# Patient Record
Sex: FEMALE | Race: WHITE | NOT HISPANIC OR LATINO | Employment: OTHER | URBAN - METROPOLITAN AREA
[De-identification: names, ages, dates, MRNs, and addresses within clinical notes are randomized per-mention and may not be internally consistent; named-entity substitution may affect disease eponyms.]

---

## 2017-05-25 ENCOUNTER — HOSPITAL ENCOUNTER (OUTPATIENT)
Dept: MAMMOGRAPHY | Facility: CLINIC | Age: 62
Discharge: HOME | End: 2017-05-25
Attending: FAMILY MEDICINE | Admitting: FAMILY MEDICINE
Payer: COMMERCIAL

## 2017-05-25 DIAGNOSIS — Z12.31 VISIT FOR SCREENING MAMMOGRAM: ICD-10-CM

## 2017-05-25 PROCEDURE — G0202 SCR MAMMO BI INCL CAD: HCPCS

## 2017-06-03 ENCOUNTER — HEALTH MAINTENANCE LETTER (OUTPATIENT)
Age: 62
End: 2017-06-03

## 2018-06-14 ENCOUNTER — HOSPITAL ENCOUNTER (OUTPATIENT)
Dept: MAMMOGRAPHY | Facility: CLINIC | Age: 63
Discharge: HOME | End: 2018-06-14
Attending: FAMILY MEDICINE | Admitting: FAMILY MEDICINE
Payer: COMMERCIAL

## 2018-06-14 DIAGNOSIS — Z12.31 VISIT FOR SCREENING MAMMOGRAM: ICD-10-CM

## 2018-06-14 PROCEDURE — 77067 SCR MAMMO BI INCL CAD: CPT

## 2019-08-16 ENCOUNTER — HOSPITAL ENCOUNTER (OUTPATIENT)
Dept: MAMMOGRAPHY | Facility: CLINIC | Age: 64
Discharge: HOME | End: 2019-08-16
Attending: FAMILY MEDICINE | Admitting: FAMILY MEDICINE
Payer: COMMERCIAL

## 2019-08-16 DIAGNOSIS — Z12.31 VISIT FOR SCREENING MAMMOGRAM: ICD-10-CM

## 2019-08-16 PROCEDURE — 77063 BREAST TOMOSYNTHESIS BI: CPT

## 2021-01-19 ENCOUNTER — HOSPITAL ENCOUNTER (EMERGENCY)
Facility: CLINIC | Age: 66
Discharge: HOME | End: 2021-01-19
Attending: EMERGENCY MEDICINE | Admitting: EMERGENCY MEDICINE
Payer: COMMERCIAL

## 2021-01-19 VITALS
BODY MASS INDEX: 33.32 KG/M2 | OXYGEN SATURATION: 97 % | DIASTOLIC BLOOD PRESSURE: 62 MMHG | WEIGHT: 200 LBS | RESPIRATION RATE: 24 BRPM | HEART RATE: 96 BPM | SYSTOLIC BLOOD PRESSURE: 122 MMHG | HEIGHT: 65 IN | TEMPERATURE: 99.4 F

## 2021-01-19 DIAGNOSIS — U07.1 INFECTION DUE TO 2019 NOVEL CORONAVIRUS: ICD-10-CM

## 2021-01-19 DIAGNOSIS — E86.0 DEHYDRATION: ICD-10-CM

## 2021-01-19 DIAGNOSIS — R11.2 NON-INTRACTABLE VOMITING WITH NAUSEA, UNSPECIFIED VOMITING TYPE: ICD-10-CM

## 2021-01-19 LAB
ALBUMIN SERPL-MCNC: 3.4 G/DL (ref 3.4–5)
ALBUMIN UR-MCNC: 70 MG/DL
ALP SERPL-CCNC: 101 U/L (ref 40–150)
ALT SERPL W P-5'-P-CCNC: 39 U/L (ref 0–50)
ANION GAP SERPL CALCULATED.3IONS-SCNC: 5 MMOL/L (ref 3–14)
APPEARANCE UR: CLEAR
AST SERPL W P-5'-P-CCNC: 48 U/L (ref 0–45)
BASOPHILS # BLD AUTO: 0 10E9/L (ref 0–0.2)
BASOPHILS NFR BLD AUTO: 0.2 %
BILIRUB SERPL-MCNC: 0.5 MG/DL (ref 0.2–1.3)
BILIRUB UR QL STRIP: NEGATIVE
BUN SERPL-MCNC: 13 MG/DL (ref 7–30)
CALCIUM SERPL-MCNC: 9.1 MG/DL (ref 8.5–10.1)
CHLORIDE SERPL-SCNC: 104 MMOL/L (ref 94–109)
CO2 SERPL-SCNC: 27 MMOL/L (ref 20–32)
COLOR UR AUTO: YELLOW
CREAT SERPL-MCNC: 1.1 MG/DL (ref 0.52–1.04)
DIFFERENTIAL METHOD BLD: ABNORMAL
EOSINOPHIL # BLD AUTO: 0 10E9/L (ref 0–0.7)
EOSINOPHIL NFR BLD AUTO: 0 %
ERYTHROCYTE [DISTWIDTH] IN BLOOD BY AUTOMATED COUNT: 12.2 % (ref 10–15)
GFR SERPL CREATININE-BSD FRML MDRD: 52 ML/MIN/{1.73_M2}
GLUCOSE SERPL-MCNC: 134 MG/DL (ref 70–99)
GLUCOSE UR STRIP-MCNC: NEGATIVE MG/DL
HCT VFR BLD AUTO: 48.9 % (ref 35–47)
HGB BLD-MCNC: 16 G/DL (ref 11.7–15.7)
HGB UR QL STRIP: NEGATIVE
IMM GRANULOCYTES # BLD: 0 10E9/L (ref 0–0.4)
IMM GRANULOCYTES NFR BLD: 0.2 %
KETONES UR STRIP-MCNC: 10 MG/DL
LEUKOCYTE ESTERASE UR QL STRIP: ABNORMAL
LIPASE SERPL-CCNC: 190 U/L (ref 73–393)
LYMPHOCYTES # BLD AUTO: 1 10E9/L (ref 0.8–5.3)
LYMPHOCYTES NFR BLD AUTO: 19.5 %
MCH RBC QN AUTO: 30.9 PG (ref 26.5–33)
MCHC RBC AUTO-ENTMCNC: 32.7 G/DL (ref 31.5–36.5)
MCV RBC AUTO: 95 FL (ref 78–100)
MONOCYTES # BLD AUTO: 0.6 10E9/L (ref 0–1.3)
MONOCYTES NFR BLD AUTO: 12.5 %
MUCOUS THREADS #/AREA URNS LPF: PRESENT /LPF
NEUTROPHILS # BLD AUTO: 3.5 10E9/L (ref 1.6–8.3)
NEUTROPHILS NFR BLD AUTO: 67.6 %
NITRATE UR QL: NEGATIVE
NRBC # BLD AUTO: 0 10*3/UL
NRBC BLD AUTO-RTO: 0 /100
PH UR STRIP: 5.5 PH (ref 5–7)
PLATELET # BLD AUTO: 176 10E9/L (ref 150–450)
POTASSIUM SERPL-SCNC: 3.7 MMOL/L (ref 3.4–5.3)
PROT SERPL-MCNC: 7.8 G/DL (ref 6.8–8.8)
RBC # BLD AUTO: 5.17 10E12/L (ref 3.8–5.2)
RBC #/AREA URNS AUTO: 1 /HPF (ref 0–2)
SODIUM SERPL-SCNC: 136 MMOL/L (ref 133–144)
SOURCE: ABNORMAL
SP GR UR STRIP: 1.03 (ref 1–1.03)
SQUAMOUS #/AREA URNS AUTO: 1 /HPF (ref 0–1)
UROBILINOGEN UR STRIP-MCNC: 2 MG/DL (ref 0–2)
WBC # BLD AUTO: 5.1 10E9/L (ref 4–11)
WBC #/AREA URNS AUTO: 12 /HPF (ref 0–5)

## 2021-01-19 PROCEDURE — 85025 COMPLETE CBC W/AUTO DIFF WBC: CPT | Performed by: EMERGENCY MEDICINE

## 2021-01-19 PROCEDURE — 87086 URINE CULTURE/COLONY COUNT: CPT | Performed by: EMERGENCY MEDICINE

## 2021-01-19 PROCEDURE — 83690 ASSAY OF LIPASE: CPT | Performed by: EMERGENCY MEDICINE

## 2021-01-19 PROCEDURE — 99284 EMERGENCY DEPT VISIT MOD MDM: CPT | Mod: 25

## 2021-01-19 PROCEDURE — 93005 ELECTROCARDIOGRAM TRACING: CPT

## 2021-01-19 PROCEDURE — 80053 COMPREHEN METABOLIC PANEL: CPT | Performed by: EMERGENCY MEDICINE

## 2021-01-19 PROCEDURE — 81001 URINALYSIS AUTO W/SCOPE: CPT | Performed by: EMERGENCY MEDICINE

## 2021-01-19 PROCEDURE — 96361 HYDRATE IV INFUSION ADD-ON: CPT

## 2021-01-19 PROCEDURE — 96374 THER/PROPH/DIAG INJ IV PUSH: CPT

## 2021-01-19 PROCEDURE — 250N000011 HC RX IP 250 OP 636: Performed by: EMERGENCY MEDICINE

## 2021-01-19 PROCEDURE — 258N000003 HC RX IP 258 OP 636: Performed by: EMERGENCY MEDICINE

## 2021-01-19 RX ORDER — SODIUM CHLORIDE 9 MG/ML
INJECTION, SOLUTION INTRAVENOUS CONTINUOUS
Status: DISCONTINUED | OUTPATIENT
Start: 2021-01-19 | End: 2021-01-19 | Stop reason: HOSPADM

## 2021-01-19 RX ORDER — LIDOCAINE 40 MG/G
CREAM TOPICAL
Status: DISCONTINUED | OUTPATIENT
Start: 2021-01-19 | End: 2021-01-19 | Stop reason: HOSPADM

## 2021-01-19 RX ORDER — ONDANSETRON 2 MG/ML
4 INJECTION INTRAMUSCULAR; INTRAVENOUS ONCE
Status: COMPLETED | OUTPATIENT
Start: 2021-01-19 | End: 2021-01-19

## 2021-01-19 RX ORDER — ONDANSETRON 4 MG/1
4 TABLET, ORALLY DISINTEGRATING ORAL EVERY 8 HOURS PRN
Qty: 10 TABLET | Refills: 0 | Status: SHIPPED | OUTPATIENT
Start: 2021-01-19 | End: 2021-01-22

## 2021-01-19 RX ADMIN — ONDANSETRON 4 MG: 2 INJECTION INTRAMUSCULAR; INTRAVENOUS at 17:32

## 2021-01-19 RX ADMIN — SODIUM CHLORIDE 500 ML: 9 INJECTION, SOLUTION INTRAVENOUS at 17:34

## 2021-01-19 ASSESSMENT — ENCOUNTER SYMPTOMS
FEVER: 0
VOMITING: 1
DYSURIA: 0
ABDOMINAL PAIN: 0
COUGH: 1
SHORTNESS OF BREATH: 0

## 2021-01-19 ASSESSMENT — MIFFLIN-ST. JEOR: SCORE: 1448.07

## 2021-01-19 NOTE — ED PROVIDER NOTES
History   Chief Complaint:  Covid Concern      HPI  Smita Vazquez is a 65 year old female, with a history of a single kidney with Chronic Kidney Disease, hypertension, hyperlipidemia, who presents to the ED for evaluation of vomiting,  feeling weak and lightheaded and is being diagnosed with Covid on January 11. The patient reports that she has been vomiting and has been able to keep down very little.  She denies blood in her vomit.  She has not felt feverish.  She has had a cough but denies feeling short of breath.  She had severe back pain initially but that has improved.  She has had lightheadedness but has not had syncope.  She has been very fatigued and has been able to do little more than walk to the bathroom and back.  She is concerned about her kidney and dehydration.      Allergies:  Carbamazepine  Phenytoin  Sulfa drugs    Medications:    Duloxetine  Inderal LA  Zocor  Ditropan XL  Imitrex    Past Medical History:    Anxiety  Chronic renal disease stage III  Obesity  Essential tremor  GERD  OAB  Depression  Insomnia  Hyperlipidemia  Hypertension     Past Surgical History:    Hysterectomy  Bladder repair  Nephrectomy - left  Tonsillectomy    Family History:    The patient denies past family history.     Social History:  Marital Status:   Employer: [employer]  Smoking status: No - former  Alcohol use: No  Drug use: No  PCP: Demetri Eisenberg  Presents to the ED with self    Review of Systems   Constitutional: Negative for fever.   Respiratory: Positive for cough. Negative for shortness of breath.    Cardiovascular: Negative for chest pain.   Gastrointestinal: Positive for vomiting. Negative for abdominal pain.   Genitourinary: Negative for dysuria.   All other systems reviewed and are negative.      Physical Exam     Patient Vitals for the past 24 hrs:   BP Temp Pulse Resp SpO2 Height Weight   01/19/21 2030 (!) 165/80 -- 89 -- 97 % -- --   01/19/21 2015 (!) 151/77 -- 85 -- 97 %  "-- --   01/19/21 2000 (!) 141/74 -- 89 -- 97 % -- --   01/19/21 1945 (!) 146/64 -- 86 -- 96 % -- --   01/19/21 1930 (!) 147/80 -- 97 -- (!) 85 % -- --   01/19/21 1915 (!) 149/110 -- 56 -- 98 % -- --   01/19/21 1900 (!) 147/78 -- 84 -- 96 % -- --   01/19/21 1845 127/68 -- 86 -- 96 % -- --   01/19/21 1830 (!) 155/71 -- 89 -- 97 % -- --   01/19/21 1815 (!) 149/76 -- 88 -- 96 % -- --   01/19/21 1800 (!) 149/71 -- 87 -- 96 % -- --   01/19/21 1745 (!) 155/81 -- 87 -- 95 % -- --   01/19/21 1730 (!) 151/78 -- 92 -- 95 % -- --   01/19/21 1658 122/81 -- -- -- -- -- --   01/19/21 1657 -- 99.4  F (37.4  C) 127 24 100 % 1.651 m (5' 5\") 90.7 kg (200 lb)       Physical Exam    HENT:    Mouth/Throat: Oropharynx is without swelling or erythema. Oral mucosa dry   Eyes: Conjunctivae are normal. No scleral icterus.  Neck: Neck supple. No cervical adenopathy  Cardiovascular: Tachycardic rate, regular rhythm and intact distal pulses.    Pulmonary/Chest: Effort normal and breath sounds normal.   Abdominal: Soft.  No distension. There is no tenderness.   Musculoskeletal:  No edema, No calf tenderness  Neurological:Alert and answering questions appropriately. Coordination normal. Symmetrically moving all extremities.   Skin: Skin is warm and dry.   Psychiatric: Normal mood and affect.     Emergency Department Course   ECG (17:50:40):  Rate 89 bpm. ME interval 130. QRS duration 72. QT/QTc 374/455. P-R-T axes 58 -31 15. Normal sinus rhythm. Left axis deviation. Abnormal ECG. T wave flattened lateral c/w 3/10/16 Interpreted at 1758 by Thelma Beckman MD.    Laboratory:  Laboratory findings were communicated with the patient who voiced understanding of the findings.    CBC: WBC: 5.1, HGB: 16.0 (H), PLT: 176    CMP: Glc 134 (H), Creatinine 1.10 (H), GFR 52 (L), AST 48 (H) o/w WNL    UA with micro: Ketone 10, Albumin 70, Leukocyte Esterase Small, WBC/HPF 12 (H), Mucus Present o/w negative    Lipase: 190     Urine Culture Aerobic " Bacterial: Pending    Emergency Department Course:    Reviewed:  I reviewed the patient's nursing notes, vitals, past medical records, Care Everywhere.     Assessments:  1707 I first assessed the patient and performed an exam. Discussed plans for care.     1916 I rechecked the patient and updated her on her results so far.    Interventions:  1732: Zofran 4 mg IV  1734:  mL IV Bolus     Disposition:  The patient was discharged to home.       Impression & Plan    Medical Decision Making:   Smita Vazquez is a 65 year old female who has 1 kidney secondary to donating a kidney presents with known COVID infection and now nausea, vomiting, and concerns for dehydration.presents. She is especially concerned due to her single kidney and worsening renal disease. ED evaluation is as noted above and reassuring and generally unremarkable. She did have some small leukocyte esterase and 12 WBCs but without urine symptoms. With shared decision making, we elected not to initiate antibiotics. I did send it for culture as she does only have one kidney. She understands she will be called if this is positive. She is feeling better and is tolerating PO. At this point, she feels ready for discharge home. With reasonable clinical certainty, I feel she is safe for discharge home with ongoing evaluation and management as an outpatient. The differential diagnosis included but was not limited to acute renal failure, electrolyte abnormality, dehydration, anemia, hepatitis, urinary tract infection, occult infection.She understands if she has inadequately controlled symptoms or any new concerning symptoms. Otherwise she will follow up with clinic in 1 week for repeat urine as she has been monitoring for protein, and there is protein in the urine today.     Diagnosis:     ICD-10-CM    1. Non-intractable vomiting with nausea, unspecified vomiting type  R11.2 CANCELED: Urine Culture Aerobic Bacterial   2. Dehydration  E86.0    3. Infection  due to 2019 novel coronavirus  U07.1        Disposition:   Discharged to home.    Discharge Medications:  New Prescriptions    ONDANSETRON (ZOFRAN ODT) 4 MG ODT TAB    Take 1 tablet (4 mg) by mouth every 8 hours as needed for nausea        Scribe Disclosure:  LEILANI, Blanche Alex, am serving as a scribe on 1/19/2021 at 5:07 PM to personally document services performed by Thelma Beckman MD, based on my observations and the provider's statements to me.           Thelma Beckman MD  01/20/21 0244

## 2021-01-19 NOTE — ED TRIAGE NOTES
"Pt presents with known COVID infection- on day 10 of symptoms. Pt says she isn't getting any better and that she \"can hardly get up from the bed to go to the bathroom.\" Denies SOB. Pt states she only has one kidney and is concerned maybe she has a complication with that.   "

## 2021-01-20 LAB
BACTERIA SPEC CULT: NORMAL
INTERPRETATION ECG - MUSE: NORMAL
Lab: NORMAL
SPECIMEN SOURCE: NORMAL

## 2021-01-20 NOTE — DISCHARGE INSTRUCTIONS
Diagnosis:Vomiting and Dehydration  Covid    What do you do next:   Continue your home medications unless we have specifically changed them  Follow up as indicated below.   Zofran for nausea  Oral hydration  Repeat urine test in 1 week to follow up on protein.   Urine culture pending  When do you return to the ED: If you have inadequately controlled symptoms, or any new  symptoms that concern you or worsening symptoms, please return to the ED for repeat evaluation.    Thank you for allowing us to care for you today.

## 2021-01-21 NOTE — RESULT ENCOUNTER NOTE
Final urine culture report is NEGATIVE per [facility] Lab Result protocol.    If NEGATIVE result, no change in treatment, per [facility] Lab Result protocol.

## 2021-01-26 ENCOUNTER — HOSPITAL ENCOUNTER (OUTPATIENT)
Facility: CLINIC | Age: 66
Setting detail: OBSERVATION
Discharge: HOME | End: 2021-01-27
Attending: EMERGENCY MEDICINE | Admitting: INTERNAL MEDICINE
Payer: COMMERCIAL

## 2021-01-26 ENCOUNTER — APPOINTMENT (OUTPATIENT)
Dept: CT IMAGING | Facility: CLINIC | Age: 66
End: 2021-01-26
Attending: EMERGENCY MEDICINE
Payer: COMMERCIAL

## 2021-01-26 ENCOUNTER — APPOINTMENT (OUTPATIENT)
Dept: GENERAL RADIOLOGY | Facility: CLINIC | Age: 66
End: 2021-01-26
Attending: EMERGENCY MEDICINE
Payer: COMMERCIAL

## 2021-01-26 ENCOUNTER — APPOINTMENT (OUTPATIENT)
Dept: ULTRASOUND IMAGING | Facility: CLINIC | Age: 66
End: 2021-01-26
Attending: INTERNAL MEDICINE
Payer: COMMERCIAL

## 2021-01-26 DIAGNOSIS — I26.99 ACUTE PULMONARY EMBOLISM, UNSPECIFIED PULMONARY EMBOLISM TYPE, UNSPECIFIED WHETHER ACUTE COR PULMONALE PRESENT (H): ICD-10-CM

## 2021-01-26 DIAGNOSIS — R55 NEAR SYNCOPE: ICD-10-CM

## 2021-01-26 DIAGNOSIS — R06.02 SHORTNESS OF BREATH: ICD-10-CM

## 2021-01-26 LAB
ANION GAP SERPL CALCULATED.3IONS-SCNC: 5 MMOL/L (ref 3–14)
APTT PPP: 25 SEC (ref 22–37)
BASOPHILS # BLD AUTO: 0 10E9/L (ref 0–0.2)
BASOPHILS NFR BLD AUTO: 0.4 %
BUN SERPL-MCNC: 10 MG/DL (ref 7–30)
CALCIUM SERPL-MCNC: 9.4 MG/DL (ref 8.5–10.1)
CHLORIDE SERPL-SCNC: 108 MMOL/L (ref 94–109)
CO2 SERPL-SCNC: 28 MMOL/L (ref 20–32)
CREAT SERPL-MCNC: 0.96 MG/DL (ref 0.52–1.04)
D DIMER PPP FEU-MCNC: 0.7 UG/ML FEU (ref 0–0.5)
DIFFERENTIAL METHOD BLD: NORMAL
EOSINOPHIL # BLD AUTO: 0.2 10E9/L (ref 0–0.7)
EOSINOPHIL NFR BLD AUTO: 2 %
ERYTHROCYTE [DISTWIDTH] IN BLOOD BY AUTOMATED COUNT: 12 % (ref 10–15)
GFR SERPL CREATININE-BSD FRML MDRD: 62 ML/MIN/{1.73_M2}
GLUCOSE SERPL-MCNC: 121 MG/DL (ref 70–99)
HCT VFR BLD AUTO: 45.8 % (ref 35–47)
HGB BLD-MCNC: 14.7 G/DL (ref 11.7–15.7)
IMM GRANULOCYTES # BLD: 0 10E9/L (ref 0–0.4)
IMM GRANULOCYTES NFR BLD: 0.2 %
INR PPP: 0.91 (ref 0.86–1.14)
LYMPHOCYTES # BLD AUTO: 1.2 10E9/L (ref 0.8–5.3)
LYMPHOCYTES NFR BLD AUTO: 14.1 %
MAGNESIUM SERPL-MCNC: 2.2 MG/DL (ref 1.6–2.3)
MCH RBC QN AUTO: 30.8 PG (ref 26.5–33)
MCHC RBC AUTO-ENTMCNC: 32.1 G/DL (ref 31.5–36.5)
MCV RBC AUTO: 96 FL (ref 78–100)
MONOCYTES # BLD AUTO: 0.7 10E9/L (ref 0–1.3)
MONOCYTES NFR BLD AUTO: 8.1 %
NEUTROPHILS # BLD AUTO: 6.4 10E9/L (ref 1.6–8.3)
NEUTROPHILS NFR BLD AUTO: 75.2 %
NRBC # BLD AUTO: 0 10*3/UL
NRBC BLD AUTO-RTO: 0 /100
PLATELET # BLD AUTO: 351 10E9/L (ref 150–450)
POTASSIUM SERPL-SCNC: 3.1 MMOL/L (ref 3.4–5.3)
POTASSIUM SERPL-SCNC: 3.2 MMOL/L (ref 3.4–5.3)
RBC # BLD AUTO: 4.78 10E12/L (ref 3.8–5.2)
SODIUM SERPL-SCNC: 141 MMOL/L (ref 133–144)
TROPONIN I SERPL-MCNC: <0.015 UG/L (ref 0–0.04)
WBC # BLD AUTO: 8.5 10E9/L (ref 4–11)

## 2021-01-26 PROCEDURE — 250N000009 HC RX 250: Performed by: EMERGENCY MEDICINE

## 2021-01-26 PROCEDURE — 99220 PR INITIAL OBSERVATION CARE,LEVEL III: CPT | Performed by: INTERNAL MEDICINE

## 2021-01-26 PROCEDURE — 36415 COLL VENOUS BLD VENIPUNCTURE: CPT | Performed by: INTERNAL MEDICINE

## 2021-01-26 PROCEDURE — 93005 ELECTROCARDIOGRAM TRACING: CPT

## 2021-01-26 PROCEDURE — 99285 EMERGENCY DEPT VISIT HI MDM: CPT | Mod: 25

## 2021-01-26 PROCEDURE — 84484 ASSAY OF TROPONIN QUANT: CPT | Performed by: EMERGENCY MEDICINE

## 2021-01-26 PROCEDURE — 93970 EXTREMITY STUDY: CPT

## 2021-01-26 PROCEDURE — 71045 X-RAY EXAM CHEST 1 VIEW: CPT

## 2021-01-26 PROCEDURE — 83735 ASSAY OF MAGNESIUM: CPT | Performed by: EMERGENCY MEDICINE

## 2021-01-26 PROCEDURE — 71275 CT ANGIOGRAPHY CHEST: CPT

## 2021-01-26 PROCEDURE — 84132 ASSAY OF SERUM POTASSIUM: CPT | Performed by: INTERNAL MEDICINE

## 2021-01-26 PROCEDURE — G0378 HOSPITAL OBSERVATION PER HR: HCPCS

## 2021-01-26 PROCEDURE — 80048 BASIC METABOLIC PNL TOTAL CA: CPT | Performed by: EMERGENCY MEDICINE

## 2021-01-26 PROCEDURE — 250N000013 HC RX MED GY IP 250 OP 250 PS 637: Performed by: INTERNAL MEDICINE

## 2021-01-26 PROCEDURE — 250N000011 HC RX IP 250 OP 636: Performed by: EMERGENCY MEDICINE

## 2021-01-26 PROCEDURE — 96372 THER/PROPH/DIAG INJ SC/IM: CPT | Mod: 59 | Performed by: EMERGENCY MEDICINE

## 2021-01-26 PROCEDURE — 94640 AIRWAY INHALATION TREATMENT: CPT

## 2021-01-26 PROCEDURE — 85379 FIBRIN DEGRADATION QUANT: CPT | Performed by: EMERGENCY MEDICINE

## 2021-01-26 PROCEDURE — 85610 PROTHROMBIN TIME: CPT | Performed by: EMERGENCY MEDICINE

## 2021-01-26 PROCEDURE — 250N000013 HC RX MED GY IP 250 OP 250 PS 637: Performed by: EMERGENCY MEDICINE

## 2021-01-26 PROCEDURE — 85730 THROMBOPLASTIN TIME PARTIAL: CPT | Performed by: EMERGENCY MEDICINE

## 2021-01-26 PROCEDURE — 85025 COMPLETE CBC W/AUTO DIFF WBC: CPT | Performed by: EMERGENCY MEDICINE

## 2021-01-26 RX ORDER — ONDANSETRON 2 MG/ML
4 INJECTION INTRAMUSCULAR; INTRAVENOUS EVERY 6 HOURS PRN
Status: DISCONTINUED | OUTPATIENT
Start: 2021-01-26 | End: 2021-01-27 | Stop reason: HOSPADM

## 2021-01-26 RX ORDER — SUMATRIPTAN 25 MG/1
25 TABLET, FILM COATED ORAL
COMMUNITY

## 2021-01-26 RX ORDER — ACETAMINOPHEN 325 MG/1
650 TABLET ORAL EVERY 4 HOURS PRN
Status: DISCONTINUED | OUTPATIENT
Start: 2021-01-26 | End: 2021-01-27 | Stop reason: HOSPADM

## 2021-01-26 RX ORDER — SIMVASTATIN 40 MG
40 TABLET ORAL AT BEDTIME
Status: DISCONTINUED | OUTPATIENT
Start: 2021-01-26 | End: 2021-01-27 | Stop reason: HOSPADM

## 2021-01-26 RX ORDER — CHOLECALCIFEROL (VITAMIN D3) 50 MCG
1 TABLET ORAL DAILY
COMMUNITY

## 2021-01-26 RX ORDER — GUAIFENESIN/DEXTROMETHORPHAN 100-10MG/5
5 SYRUP ORAL EVERY 4 HOURS PRN
Status: DISCONTINUED | OUTPATIENT
Start: 2021-01-26 | End: 2021-01-27 | Stop reason: HOSPADM

## 2021-01-26 RX ORDER — PROPRANOLOL HYDROCHLORIDE 160 MG/1
160 CAPSULE, EXTENDED RELEASE ORAL DAILY
COMMUNITY

## 2021-01-26 RX ORDER — ONDANSETRON 4 MG/1
4 TABLET, ORALLY DISINTEGRATING ORAL EVERY 6 HOURS PRN
Status: DISCONTINUED | OUTPATIENT
Start: 2021-01-26 | End: 2021-01-27 | Stop reason: HOSPADM

## 2021-01-26 RX ORDER — ACETAMINOPHEN 325 MG/1
650 TABLET ORAL EVERY 4 HOURS PRN
Status: DISCONTINUED | OUTPATIENT
Start: 2021-01-26 | End: 2021-01-26

## 2021-01-26 RX ORDER — HYDRALAZINE HYDROCHLORIDE 10 MG/1
10 TABLET, FILM COATED ORAL 4 TIMES DAILY PRN
Status: DISCONTINUED | OUTPATIENT
Start: 2021-01-26 | End: 2021-01-27 | Stop reason: HOSPADM

## 2021-01-26 RX ORDER — DULOXETIN HYDROCHLORIDE 60 MG/1
60 CAPSULE, DELAYED RELEASE ORAL DAILY
Status: DISCONTINUED | OUTPATIENT
Start: 2021-01-27 | End: 2021-01-27 | Stop reason: HOSPADM

## 2021-01-26 RX ORDER — ACETAMINOPHEN 650 MG/1
650 SUPPOSITORY RECTAL EVERY 4 HOURS PRN
Status: DISCONTINUED | OUTPATIENT
Start: 2021-01-26 | End: 2021-01-27 | Stop reason: HOSPADM

## 2021-01-26 RX ORDER — POTASSIUM CHLORIDE 1500 MG/1
40 TABLET, EXTENDED RELEASE ORAL ONCE
Status: COMPLETED | OUTPATIENT
Start: 2021-01-26 | End: 2021-01-26

## 2021-01-26 RX ORDER — IOPAMIDOL 755 MG/ML
500 INJECTION, SOLUTION INTRAVASCULAR ONCE
Status: COMPLETED | OUTPATIENT
Start: 2021-01-26 | End: 2021-01-26

## 2021-01-26 RX ORDER — TEMAZEPAM 15 MG/1
15 CAPSULE ORAL
Status: DISCONTINUED | OUTPATIENT
Start: 2021-01-26 | End: 2021-01-26

## 2021-01-26 RX ORDER — ASPIRIN 325 MG
325 TABLET ORAL EVERY EVENING
Status: DISCONTINUED | OUTPATIENT
Start: 2021-01-26 | End: 2021-01-27 | Stop reason: HOSPADM

## 2021-01-26 RX ORDER — ALBUTEROL SULFATE 90 UG/1
6 AEROSOL, METERED RESPIRATORY (INHALATION) ONCE
Status: COMPLETED | OUTPATIENT
Start: 2021-01-26 | End: 2021-01-26

## 2021-01-26 RX ADMIN — ENOXAPARIN SODIUM 90 MG: 100 INJECTION SUBCUTANEOUS at 19:43

## 2021-01-26 RX ADMIN — POTASSIUM CHLORIDE 40 MEQ: 1500 TABLET, EXTENDED RELEASE ORAL at 23:52

## 2021-01-26 RX ADMIN — IOPAMIDOL 67 ML: 755 INJECTION, SOLUTION INTRAVENOUS at 16:50

## 2021-01-26 RX ADMIN — ALBUTEROL SULFATE 6 PUFF: 90 AEROSOL, METERED RESPIRATORY (INHALATION) at 15:49

## 2021-01-26 RX ADMIN — SODIUM CHLORIDE 86 ML: 9 INJECTION, SOLUTION INTRAVENOUS at 16:50

## 2021-01-26 RX ADMIN — SIMVASTATIN 40 MG: 40 TABLET, FILM COATED ORAL at 20:52

## 2021-01-26 ASSESSMENT — MIFFLIN-ST. JEOR: SCORE: 1438.09

## 2021-01-26 NOTE — ED NOTES
[facility]  ED Nurse Handoff Report    Smita Vazquez is a 65 year old female   ED Chief complaint: Shortness of Breath  . ED Diagnosis:   Final diagnoses:   None     Allergies:   Allergies   Allergen Reactions     Carbamazepine      Dilantin [Phenytoin]      Sulfa Drugs        Code Status: Full Code  Activity level - Baseline/Home:  Independent. Activity Level - Current:   Stand by Assist. Lift room needed: No. Bariatric: No   Needed: No   Isolation: Yes. Infection: Not Applicable  COVID r/o and special precautions.     Vital Signs:   Vitals:    01/26/21 1305 01/26/21 1330 01/26/21 1345 01/26/21 1350   BP: (!) 178/93 (!) 161/75 (!) 149/71    Pulse: 108 87 75 71   Resp: 18 25 22 22   Temp:       TempSrc:       SpO2: 97% 99% 97% 97%       Cardiac Rhythm:  ,      Pain level:    Patient confused: No. Patient Falls Risk: Yes.   Elimination Status: Has voided   Patient Report - Initial Complaint: SOB. Focused Assessment: Respiratory - Respiratory WDL: all (Pt dx with covid 2 weeks ago. Pt having increased fatigue, SOB, and coughing since last week. Pt feels like she'll pass out when doing tasks. ) Rhythm/Pattern, Respiratory: shortness of breath  Cough Frequency: infrequent    Tests Performed: labs. Abnormal Results:   Labs Ordered and Resulted from Time of ED Arrival Up to the Time of Departure from the ED   BASIC METABOLIC PANEL - Abnormal; Notable for the following components:       Result Value    Potassium 3.2 (*)     Glucose 121 (*)     All other components within normal limits   CBC WITH PLATELETS DIFFERENTIAL   TROPONIN I   D DIMER QUANTITATIVE     CT Chest Pulmonary Embolism w Contrast   Final Result   IMPRESSION:   1.  Small focal isolated nonocclusive embolus within a superior segment pulmonary arterial segmental branch superior segment left lower lobe.      2.  Multiple small geographic regions of ground-glass infiltrate throughout the lungs typical of COVID-19 pneumonia.      3.   Previous left nephrectomy.      Results called to Krista Tristan      XR Chest Port 1 View   Final Result   IMPRESSION: Single portable AP view of the chest was obtained. Stable   cardiomediastinal silhouette. Bilateral perihilar predominant   interstitial pulmonary opacities, left worse than right, could   represent pulmonary edema versus infection. No significant pleural   effusion or pneumothorax.      SHAHBAZ FRANCOIS MD          Treatments provided: Neb, Lovenox  Family Comments: N/A  OBS brochure/video discussed/provided to patient:  Yes  ED Medications:   Medications   albuterol (PROAIR HFA/PROVENTIL HFA/VENTOLIN HFA) 108 (90 Base) MCG/ACT inhaler 6 puff (has no administration in time range)     Drips infusing:  No  For the majority of the shift, the patient's behavior Green. Interventions performed were N/A.    Sepsis treatment initiated: No     Patient tested for COVID 19 prior to admission: NO - known covid positive    ED Nurse Name/Phone Number: Radha Olsen RN,   1650 PM    RECEIVING UNIT ED HANDOFF REVIEW    Above ED Nurse Handoff Report was reviewed: Yes  Reviewed by: Zuleika Castillo RN on January 26, 2021 at 7:33 PM

## 2021-01-26 NOTE — ED PROVIDER NOTES
History   Chief Complaint:  Shortness of Breath       HPI   Smita Vazquez is a 65 year old female with history of hypertension and hyperlipidemia who presents with shortness of breath. The patient states that she was diagnosed with COVID-19 about two weeks ago and for that time has had an intermittent cough that worsened four days ago. She says it is worse with exertion and that it is difficult for her to take a deep breath. She also notes that yesterday she was experiencing light-headedness but did not have a syncopal event. She says that she has also had intermittent diarrhea, and she was vomiting but is not anymore, so her food intake is back to normal. She also notes a mild headache. She denies blood in stool, chest pain, rash, dysuria, recent allergic reaction, or any rash or bruising.      Review of Systems   All other systems reviewed and are negative.    Allergies:  Carbamazepine  Dilantin  Sulfa Drugs      Medications:  Aspirin 325  Duloxetine HCl  Methocarbamol  Ditropan  Simvastatin  Temazepam  Inderal      Past Medical History:    Ventricular tachycardia  Anxiety  Chronic renal disease,stage III  Obesity  Essential tremor  GERD  Major depression  Insomnia  Hyperlipidemia  Hypertension       Past Surgical History:    Nephrectomy left, kidney donor for son  Hysterectomy  Bladder repair  Tonsillectomy       Family History:    The patient denies past family history.       Social History:  The patient denies alcohol use.  The patient denies tobacco use.    Physical Exam     Patient Vitals for the past 24 hrs:   BP Temp Temp src Pulse Resp SpO2 Weight   01/26/21 1850 -- -- -- 106 22 -- --   01/26/21 1845 121/66 -- -- 135 (!) 42 -- --   01/26/21 1844 121/66 -- -- 98 29 95 % 92.1 kg (203 lb 0.7 oz)   01/26/21 1800 136/64 -- -- 101 22 98 % --   01/26/21 1630 (!) 179/88 -- -- 115 17 98 % --   01/26/21 1530 123/71 -- -- 77 23 98 % --   01/26/21 1500 (!) 147/92 -- -- 77 25 99 % --   01/26/21 1350 -- -- -- 71 22  97 % --   01/26/21 1345 (!) 149/71 -- -- 75 22 97 % --   01/26/21 1330 (!) 161/75 -- -- 87 25 99 % --   01/26/21 1305 (!) 178/93 -- -- 108 18 97 % --   01/26/21 1225 (!) 140/91 97.3  F (36.3  C) Temporal 141 22 98 % --       Physical Exam  General: Resting on the bed.  Head: No obvious trauma to head.  Ears, Nose, Throat:  External ears normal.  Nose normal.    Eyes:  Conjunctivae clear.  Pupils are equal, round, and reactive.   Neck: Normal range of motion.  Neck supple.   CV: Tachycardic rate and rhythm.  No murmurs.      Respiratory: Effort normal and breath sounds normal.  No wheezing or crackles.   Gastrointestinal: Soft.  No distension. There is no tenderness.  There is no rigidity, no rebound and no guarding.   Musculoskeletal: Non tender non edematous calves  Neuro: Alert. Moving all extremities appropriately.  Normal speech.    Skin: Skin is warm and dry.  No rash noted.     Emergency Department Course     ECG:  ECG taken at 1229, ECG read at 1233  Sinus tachycardia  Otherwise normal ECG  Rate 113 bpm. NV interval 132 ms. QRS duration 74 ms. QT/QTc 354/485 ms. P-R-T axes 65 -23 36.      Imaging:  CT Chest Pulmonary Embolism w Contrast  1.  Small focal isolated nonocclusive embolus within a superior segment pulmonary arterial segmental branch superior segment left lower lobe.  2.  Multiple small geographic regions of ground-glass infiltrate throughout the lungs typical of COVID-19 pneumonia.  3.  Previous left nephrectomy.  Results called to Krista Tristan  Reading per radiology    XR Chest Port 1 View  Single portable AP view of the chest was obtained. Stable  cardiomediastinal silhouette. Bilateral perihilar predominant  interstitial pulmonary opacities, left worse than right, could  represent pulmonary edema versus infection. No significant pleural  effusion or pneumothorax.    SHAHBAZ FRANCOIS MD  Reading per radiology      Laboratory:  CBC: WBC 8.5, HGB 14.7,    BMP: Potassium 3.2 (L),  Glucose 121 (H) (Creatinine: 0.96) o/w WNL    Troponin (Collected 1310): <0.015  D Dimer (Collected 1310): 0.7 (H)  INR: 0.91  PTT: 25      Emergency Department Course:    Reviewed:  I reviewed nursing notes, vitals, past medical history and care everywhere    Assessments:  1409 I obtained history and examined the patient as noted above.   1645 I rechecked the patient and explained findings.   1723 I rechecked the patient.    Consults:   1713 I spoke to radiology regarding the patient's CT results.  1759 I consulted Dr. Ortega of the hospitalist service. They agree to accept care of the patient.    Interventions:  154 Albuterol 6 puff Inhalation   Lovenox 90 mg Subcutaneous    Disposition:  The patient was admitted to the hospital under the care of Dr. Ortega.       Impression & Plan       Medical Decision Makin-year-old female with a recent diagnosis of COVID-19, symptoms started on , reports shortness of breath and near syncope.  Vital signs mildly tachycardic but otherwise unremarkable.  With ambulation hypoxia to 88%.  Broad differential was pursued including not limited to PE, pneumonia, pneumothorax, effusion, ACS, arrhythmia, effusion, anemia, electrolyte, metabolic, renal dysfunction, etc.  EKG shows sinus tachycardia without any evidence of acute ST-T wave changes.  No signs of arrhythmia.  Troponin is normal.  Low suspicion for ACS.  Prior to near syncopal episode had no chest pain or shortness of breath, making ACS or arrhythmia unlikely.  Considered PE and dimer is elevated therefore CT scan was obtained.  PE was seen on the left lower lobe.  This may be in fact related to patient's symptoms.  CBC shows no leukocytosis or anemia.  BMP shows no acute electrolyte, metabolic or renal dysfunction.  Coags are unremarkable.  Given that symptoms improved and then worsened, her PE seems to be more likely the etiology of her symptoms.  Plan to admit given near syncopal episode, hypoxia  with ambulation and generalized weakness.  Discussed with hospitalist who graciously excepted.  We opted to give a dose of Lovenox in the emergency department.    Covid-19  Smita Vazquez was evaluated during a global COVID-19 pandemic, which necessitated consideration that the patient might be at risk for infection with the SARS-CoV-2 virus that causes COVID-19.   Applicable protocols for evaluation were followed during the patient's care.   COVID-19 was considered as part of the patient's evaluation. The plan for testing is:  a test was obtained at a previous visit and reviewed & considered today.    Diagnosis:    ICD-10-CM    1. Near syncope  R55 INR     INR     Partial thromboplastin time     Partial thromboplastin time     Partial thromboplastin time     CANCELED: INR     CANCELED: Partial thromboplastin time     CANCELED: Partial thromboplastin time   2. Shortness of breath  R06.02    3. Acute pulmonary embolism, unspecified pulmonary embolism type, unspecified whether acute cor pulmonale present (H)  I26.99        Discharge Medications:  New Prescriptions    No medications on file       Scribe Disclosure:  IBlanche, am serving as a scribe at 2:08 PM on 1/26/2021 to document services personally performed by Krista Tristan MD based on my observations and the provider's statements to me.        Krista Tristan MD  01/26/21 1948

## 2021-01-26 NOTE — ED NOTES
"Ambulated Pt. In room with pulse ox. At rest Pt. O2 sats at 100% with RR at 18. With ambulation Pt. O2 sats at 95% on room air. Pt. Appears to have more trouble walking and moving her body than with breathing. Pt. States \"I just feel week.\" MD notified   "

## 2021-01-26 NOTE — ED TRIAGE NOTES
Patient presents with complaints of cough, SOB and dizziness. Patient was diagnosed with Covid two weeks ago. ABC intact without need for intervention at this time.

## 2021-01-27 VITALS
OXYGEN SATURATION: 95 % | HEIGHT: 64 IN | DIASTOLIC BLOOD PRESSURE: 56 MMHG | HEART RATE: 77 BPM | TEMPERATURE: 97.6 F | RESPIRATION RATE: 16 BRPM | WEIGHT: 201.3 LBS | BODY MASS INDEX: 34.37 KG/M2 | SYSTOLIC BLOOD PRESSURE: 122 MMHG

## 2021-01-27 LAB
INTERPRETATION ECG - MUSE: NORMAL
POTASSIUM SERPL-SCNC: 3.7 MMOL/L (ref 3.4–5.3)

## 2021-01-27 PROCEDURE — 84132 ASSAY OF SERUM POTASSIUM: CPT | Mod: 91 | Performed by: PHYSICIAN ASSISTANT

## 2021-01-27 PROCEDURE — 99217 PR OBSERVATION CARE DISCHARGE: CPT | Performed by: PHYSICIAN ASSISTANT

## 2021-01-27 PROCEDURE — 36415 COLL VENOUS BLD VENIPUNCTURE: CPT | Performed by: PHYSICIAN ASSISTANT

## 2021-01-27 PROCEDURE — 250N000013 HC RX MED GY IP 250 OP 250 PS 637: Performed by: INTERNAL MEDICINE

## 2021-01-27 PROCEDURE — 96372 THER/PROPH/DIAG INJ SC/IM: CPT | Performed by: INTERNAL MEDICINE

## 2021-01-27 PROCEDURE — 250N000011 HC RX IP 250 OP 636: Performed by: INTERNAL MEDICINE

## 2021-01-27 PROCEDURE — G0378 HOSPITAL OBSERVATION PER HR: HCPCS

## 2021-01-27 RX ORDER — APIXABAN 5 MG (74)
KIT ORAL
Qty: 74 EACH | Refills: 0 | Status: SHIPPED | OUTPATIENT
Start: 2021-01-27 | End: 2021-02-26

## 2021-01-27 RX ADMIN — ENOXAPARIN SODIUM 90 MG: 100 INJECTION SUBCUTANEOUS at 08:56

## 2021-01-27 RX ADMIN — DULOXETINE 60 MG: 60 CAPSULE, DELAYED RELEASE ORAL at 08:56

## 2021-01-27 NOTE — DISCHARGE SUMMARY
Discharge Summary  Hospitalist Service    Smita Vazquez MRN# [id number]   YOB: 1955 Age: 65 year old     Date of Admission:  1/26/2021  Date of Discharge:  1/27/2021  Admitting Physician: Bennett Ortega MD  Discharge Physician: Varsha Valdes PA-C  Discharging Service: Hospitalist Service     Primary Provider: Demetri Herrera  Primary Care Physician Phone Number: [phone]         Discharge Diagnoses/Problem Oriented Hospital Course (Providers):    Smita Vazquez was admitted on 1/26/2021 by Bennett Ortega MD and I would refer you to their history and physical.  Briefly, patient was admitted with complaints of coughing and near hypoxia. CT of the chest revealed a small pulmonary embolism. No risk factors with the exception of recent COVID infection. ECG did not show evidence of right heart strain and troponin undetectable. Doppler ultrasounds of lower legs was negative. Started on Lovenox 1mg/kg BID and ultimately discharged on Eliquis.  At time of discharge she was ambulating without hypoxia. Recommend follow up with PCP to determine how long anticoagulation is needed.          Code Status:      Full Code        Brief Hospital Stay Summary Sent Home With Patient in AVS:        Reason for your hospital stay      We suspect you have pulmonary embolism related to your recent COVID   infection. This is what is making your short of breath and may be   contributing to your cough. Ultrasounds of your legs was negative for   blood clot. Your oxygen levels were good and you did not require oxygen.   However, if you feel short of breath you should rest.    For anticoagulation to treat the blood clot you have chose Eliquis. You   should take 10 mg twice daily for 1 week (start tonight) and then 5 mg   twice daily until told to stop by your primary care doctor. Your primary   care doctor can decide if you should see a hematologist. It is unclear how   long you should be on  anticoagulation.                           Pending Results:        Unresulted Labs Ordered in the Past 30 Days of this Admission     No orders found for last 31 day(s).            Discharge Instructions and Follow-Up:      Follow-up Appointments     Follow-up and recommended labs and tests       Follow up with primary care provider, Demetri Eisenberg, within 7 days   to evaluate medication change and for hospital follow- up.  No follow up   labs or test are needed.               Discharge Disposition:      Discharged to home         Discharge Medications:        Current Discharge Medication List      START taking these medications    Details   Apixaban Starter Pack (ELIQUIS DVT/PE STARTER PACK) 5 MG TBPK Take 10 mg by mouth 2 times daily for 7 days, THEN 5 mg 2 times daily for 23 days.  Qty: 74 each, Refills: 0    Associated Diagnoses: Acute pulmonary embolism, unspecified pulmonary embolism type, unspecified whether acute cor pulmonale present (H)         CONTINUE these medications which have NOT CHANGED    Details   acetaminophen (TYLENOL) 325 MG tablet Take 2 tablets (650 mg) by mouth every 4 hours as needed  Qty: 40 tablet, Refills: 0      aspirin 325 MG tablet Take 325 mg by mouth every evening       DULoxetine (CYMBALTA) 60 MG capsule Take 60 mg by mouth daily       oxybutynin (DITROPAN-XL) 10 MG 24 hr tablet Take 10 mg by mouth daily       propranolol ER (INDERAL LA) 160 MG 24 hr capsule Take 160 mg by mouth daily      simvastatin (ZOCOR) 40 MG tablet Take 40 mg by mouth At Bedtime.      vitamin D3 (CHOLECALCIFEROL) 50 mcg (2000 units) tablet Take 1 tablet by mouth daily      SUMAtriptan (IMITREX) 25 MG tablet Take 25 mg by mouth at onset of headache for migraine               Allergies:         Allergies   Allergen Reactions     Carbamazepine      Dilantin [Phenytoin]      Sulfa Drugs            Consultations This Hospital Stay:      No consultations were requested during this admission         Condition  "and Physical on Discharge:      Discharge condition: Stable   Vitals: Blood pressure (!) 143/66, pulse 83, temperature 97.6  F (36.4  C), temperature source Oral, resp. rate 18, height 1.626 m (5' 4\"), weight 91.3 kg (201 lb 4.8 oz), SpO2 96 %, not currently breastfeeding.     Constitutional: Alert and orientate x 3   Lungs: CTAB   Cardiovascular: RRR with no murmur   Abdomen: Bowel sounds are present with no tenderness   Skin: No rash or open sores   Other:          Discharge Time:      Less than 30 minutes.        Image Results From This Hospital Stay (For Non-EPIC Providers):        Results for orders placed or performed during the hospital encounter of 01/26/21   XR Chest Port 1 View    Narrative    CHEST PORTABLE ONE VIEW   1/26/2021 4:17 PM     HISTORY: Short of breath.    COMPARISON: Chest x-ray on 3/10/2016.      Impression    IMPRESSION: Single portable AP view of the chest was obtained. Stable  cardiomediastinal silhouette. Bilateral perihilar predominant  interstitial pulmonary opacities, left worse than right, could  represent pulmonary edema versus infection. No significant pleural  effusion or pneumothorax.    SHAHBAZ FRANCOIS MD   CT Chest Pulmonary Embolism w Contrast    Narrative    EXAM: CT CHEST PULMONARY EMBOLISM W CONTRAST  LOCATION: [facility]  DATE/TIME: 1/26/2021 4:49 PM    INDICATION: Shortness of breath.  COMPARISON: None.  TECHNIQUE: CT chest pulmonary angiogram during arterial phase injection of IV contrast. Multiplanar reformats and MIP reconstructions were performed. Dose reduction techniques were used.   CONTRAST: 67 mL Isovue-370.    FINDINGS:  ANGIOGRAM CHEST: There is a small focal isolated nonocclusive embolus seen within a segmental pulmonary arterial branch to the superior segment of the left lower lobe. No other emboli are visualized. No pulmonary arterial enlargement. No evidence for   right heart strain. Thoracic aorta is negative for dissection or " aneurysm.    LUNGS AND PLEURA: There are multifocal regions of geographic small ground-glass infiltrates throughout the lungs typical of COVID-19 pneumonia.    MEDIASTINUM/AXILLAE: Mildly prominent but subcentimeter nodes.    UPPER ABDOMEN: Left nephrectomy.    MUSCULOSKELETAL: Normal.      Impression    IMPRESSION:  1.  Small focal isolated nonocclusive embolus within a superior segment pulmonary arterial segmental branch superior segment left lower lobe.    2.  Multiple small geographic regions of ground-glass infiltrate throughout the lungs typical of COVID-19 pneumonia.    3.  Previous left nephrectomy.    Results called to Krista Tristan   US Lower Extremity Venous Duplex Bilateral    Narrative    EXAM: US LOWER EXTREMITY VENOUS DUPLEX BILATERAL  LOCATION: [facility]  DATE/TIME: 1/26/2021 7:13 PM    INDICATION: Pulmonary embolism. Assess residual clot burden.  COMPARISON: Chest CTA from today  TECHNIQUE: Venous Duplex ultrasound of bilateral lower extremities with and without compression, augmentation and duplex. Color flow and spectral Doppler with waveform analysis performed.    FINDINGS: Exam includes the common femoral, femoral, popliteal veins as well as segmentally visualized deep calf veins and greater saphenous vein.     RIGHT: No deep vein thrombosis. No superficial thrombophlebitis. No popliteal cyst.    LEFT: No deep vein thrombosis. No superficial thrombophlebitis. No popliteal cyst.      Impression    IMPRESSION:  1.  No deep venous thrombosis in the bilateral lower extremities.           Most Recent Lab Results In EPIC (For Non-EPIC Providers):    Most Recent 3 CBC's:  Recent Labs   Lab Test 01/26/21  1310 01/19/21  1726 03/10/16  0920   WBC 8.5 5.1 5.2   HGB 14.7 16.0* 14.0   MCV 96 95 94    176 198      Most Recent 3 BMP's:  Recent Labs   Lab Test 01/27/21  0850 01/26/21  2203 01/26/21  1310 01/19/21  1726 03/10/16  0920   NA  --   --  141 136 139   POTASSIUM 3.7 3.1* 3.2*  3.7 4.3   CHLORIDE  --   --  108 104 107   CO2  --   --  28 27 26   BUN  --   --  10 13 18   CR  --   --  0.96 1.10* 1.25*   ANIONGAP  --   --  5 5 6   MERLY  --   --  9.4 9.1 9.2   GLC  --   --  121* 134* 99     Most Recent 3 Troponin's:No lab results found.  Most Recent 3 INR's:  Recent Labs   Lab Test 01/26/21  1310   INR 0.91     Most Recent 2 LFT's:  Recent Labs   Lab Test 01/19/21  1726   AST 48*   ALT 39   ALKPHOS 101   BILITOTAL 0.5     Most Recent Cholesterol Panel:No lab results found.  Most Recent 6 Bacteria Isolates From Any Culture (See EPIC Reports for Culture Details):  Recent Labs   Lab Test 01/19/21 1932   CULT 10,000 to 50,000 colonies/mL  mixed urogenital alondra  Susceptibility testing not routinely done       Most Recent TSH, T4 and HgbA1c: No lab results found.

## 2021-01-27 NOTE — PLAN OF CARE
PRIMARY DIAGNOSIS: SOB, COVID, NEAR SYNCOPE, ACUTE PULMONARY EMBOLISM.  OUTPATIENT/OBSERVATION GOALS TO BE MET BEFORE DISCHARGE:  1. Vital signs stable: Yes    2. Dyspnea improved and O2 sats >88% at RA or at prior home O2 therapy level: Yes      SpO2: 97 %, O2 Device: None (Room air)    3. Short term supplemental O2 needed for use with activity at home: No    4. Tolerating adequate PO diet and medications: Yes    5. Return to near baseline physical activity: Yes    Vitals are Temp: 98.2  F (36.8  C) Temp src: Oral BP: 131/72 Pulse: 92   Resp: 20 SpO2: 97 %.  Patient is Alert and Oriented x4. She is are SBA with no assistive devices .  Patient is on Droplet precuations for Covid.  Pt is a Regular diet. She is denying pain.  Patient is Saline locked. Potassium at 3.2 from 1300 at ED. PA updated order to re-check and RN Standard Replacement order placed. Will Continue to monitor.     Discharge Planner Nurse   Safe discharge environment identified: Yes  Barriers to discharge: Yes       Entered by: Zuleika Castillo 01/26/2021 10:21 PM     Please review provider order for any additional goals.   Nurse to notify provider when observation goals have been met and patient is ready for discharge.

## 2021-01-27 NOTE — PLAN OF CARE
PRIMARY DIAGNOSIS: SOB, COVID, NEAR SYNCOPE, ACUTE PULMONARY EMBOLISM.  OUTPATIENT/OBSERVATION GOALS TO BE MET BEFORE DISCHARGE:  1. Vital signs stable: Yes    2. Dyspnea improved and O2 sats >88% at RA or at prior home O2 therapy level: Yes      SpO2: 97 %, O2 Device: None (Room air)    3. Short term supplemental O2 needed for use with activity at home: No    4. Tolerating adequate PO diet and medications: Yes    5. Return to near baseline physical activity: Yes    Vitals are Temp: 96  F (35.6  C) Temp src: Oral BP: (!) 153/80 Pulse: 92   Resp: 18 SpO2: 97 %.  Patient is Alert and Oriented x4. She is SBA with no assistive devices .  Patient is on Droplet precuations for Covid.  Pt is a Regular diet. She is denying pain.  Patient is Saline locked. Potassium at 3.1  Replaced per order with re-check in the morning. Denies any SOB.     . Discharge Planner Nurse   Safe discharge environment identified: Yes  Barriers to discharge: Yes       Entered by: Zuleika Castillo 01/27/2021 3:29 AM     Please review provider order for any additional goals.   Nurse to notify provider when observation goals have been met and patient is ready for discharge.

## 2021-01-27 NOTE — PROGRESS NOTES
ROOM # 207    Living Situation (if not independent, order SW consult): Home with   Facility name:  :  [facility] [phone] cell- [phone]    Activity level at baseline: indpt  Activity level on admit: SBA      Patient registered to observation; given Patient Bill of Rights; given the opportunity to ask questions about observation status and their plan of care.  Patient has been oriented to the observation room, bathroom and call light is in place.    Discussed discharge goals and expectations with patient/family.

## 2021-01-27 NOTE — PLAN OF CARE
PRIMARY DIAGNOSIS: SOB/COVID   OUTPATIENT/OBSERVATION GOALS TO BE MET BEFORE DISCHARGE:  1. Vital signs stable: Yes    2. Dyspnea improved and O2 sats >88% at RA or at prior home O2 therapy level: Yes      SpO2: 96 %, O2 Device: None (Room air)    3. Short term supplemental O2 needed for use with activity at home: No    4. Tolerating adequate PO diet and medications: Yes    5. Return to near baseline physical activity: Yes    Patient alert and oriented x4. Vitals are Temp: 97.6  F (36.4  C) Temp src: Oral BP: (!) 143/66 Pulse: 83   Resp: 18 SpO2: 96 % RA. Denies pain. Reports some shortness of breath and dyspnea on exertion with ambulation and when coughing. Declining Robitussin at this time. Ambulated around room- O2 >96% RA. Continuing to have a frequent, nonproductive cough. On Lovenox. Pharmacy to consult for NOAC at home. Tolerating regular diet. Up with standby assistance. IV saline locked. Continuing with supportive cares and symptom management.     Discharge Planner Nurse   Safe discharge environment identified: Yes  Barriers to discharge: No       Entered by: Rosa Cheema 01/27/2021      Please review provider order for any additional goals.   Nurse to notify provider when observation goals have been met and patient is ready for discharge.

## 2021-01-27 NOTE — H&P
History and Physical - Hospitalist Service       Date of Admission:  1/26/2021    Assessment & Plan   Smita Vazquez is a 65 year old female admitted on 1/26/2021. She presents with coughing spells with associated near syncope and hypoxia in the 88% range with activity.  She tested positive for Covid on 1/11 and has evidence of Covid pneumonia on CT of the chest.  She also has a small pulmonary embolus on CT.  Her past medical history is significant for essential tremor, dyslipidemia, isolated episode of ventricular tachycardia and migraine headaches.    1.  Mild hypoxic respiratory failure with activity secondary to Covid pneumonia and pulmonary embolus:  --Fortunately she only mildly hypoxic with activity.  She is now at Covid for over 2 weeks and her symptoms should have already peaked.  --Started Lovenox for tiny pulmonary embolus.  --Check lower extremity Doppler ultrasounds to assess clot burden.  --Request pharmacy consultation determine which NOAC would be covered by her insurance and then switch to oral medication.  --Monitor oxygenation.  If it remains fairly stable she can discharge tomorrow.  She does have an oximeter at home.  --Would not treat with steroids or remdesivir as she is not hypoxic at rest and her symptoms should have already peaked.  --Try Robitussin for her severe coughing spells which resulted in a near syncopal event yesterday.  --Covid precautions.  She is to still be considered to have active Covid.    2.  Hypertension:  --I do not see that she is on any and hypertensive medication.  Her systolic pressures are in the 170s.  --We will await pharmacy reconciliation of medications  --Treat with as needed oral hydralazine for systolic pressure greater than 175.  --She is chronically on propranolol for essential tremors.  We will resume when this is reconciled.    3.  Essential tremor:  --Resume propranolol     Diet:  Regular  DVT Prophylaxis:  Enoxaparin (Lovenox) SQ  Garcia Catheter: not present  Code Status:  Full         Disposition Plan   Expected discharge: Tomorrow, recommended to prior living arrangement once Breathing stable cough improved.  Entered: Bennett Ortega MD 01/26/2021, 6:47 PM     The patient's care was discussed with the ER physician.    Bennett Ortega MD  [facility]  Contact information available via [facility] Paging/Directory      ______________________________________________________________________    Chief Complaint   cough    History is obtained from the patient    History of Present Illness   Smita Vazquez is a 65 year old female who presents with about 15 days of illness.  She is initially feeling a bit tired and had a mild cough on 1/9.  She called her primary care provider and had a Covid test on 1/11 that came back positive.  She had minimal symptoms for about a week but then developed some vomiting and loose stools.  Her cough also has worsened.  Her vomiting is now resolved and eating okay but she has had a severe cough over the past few days.  Yesterday she was having a severe coughing spell in the kitchen and said she felt as though she almost passed out.  She has had some mild dyspnea and also was felt generally weak and tired.    Review of Systems    A complete 10 point review of system was performed was negative except for the pertinent positive which was seen in history of present illness.    Past Medical History    I have reviewed this patient's medical history and updated it with pertinent information if needed.   Past Medical History:   Diagnosis Date     High cholesterol      Ventricular tachycardia (H)    --Episode of ventricular tachycardia was diagnosed several years ago in the emergency department.  This was shortly after the death of both of her parents.  She had a implanted rhythm monitoring for a couple of years with no recurrence.  --Essential tremor  --Nephrectomy in  2011 as she donated a kidney to her son.  --Migraines.  --Asymptomatic stroke found incidentally on MRI of the brain.    Past Surgical History   I have reviewed this patient's surgical history and updated it with pertinent information if needed.  Past Surgical History:   Procedure Laterality Date     ABDOMEN SURGERY      kidney donor for son     GYN SURGERY      hysterectomy       Social History   I have reviewed this patient's social history and updated it with pertinent information if needed.  Social History     Tobacco Use     Smoking status: Former Smoker   Substance Use Topics     Alcohol use: No     Drug use: No       Family History   Reviewed and noncontributory    Prior to Admission Medications   Prior to Admission Medications   Prescriptions Last Dose Informant Patient Reported? Taking?   DULOXETINE HCL PO   Yes No   Sig: Take 60 mg by mouth daily    TEMAZEPAM PO   Yes No   Sig: Take 15 mg by mouth nightly as needed    acetaminophen (TYLENOL) 325 MG tablet   No No   Sig: Take 2 tablets (650 mg) by mouth every 4 hours as needed   aspirin 325 MG tablet   Yes No   Sig: Take 325 mg by mouth every evening    ibuprofen (ADVIL,MOTRIN) 200 MG tablet   No No   Sig: Take 3 tablets (600 mg) by mouth every 6 hours as needed for mild pain   methocarbamol (ROBAXIN) 750 MG tablet   No No   Sig: Take 1 tablet (750 mg) by mouth 4 times daily as needed for muscle spasms   oxybutynin (DITROPAN-XL) 10 MG 24 hr tablet   Yes No   Sig: Take by mouth daily   simvastatin (ZOCOR) 40 MG tablet   Yes No   Sig: Take 40 mg by mouth At Bedtime.      Facility-Administered Medications: None     Allergies   Allergies   Allergen Reactions     Carbamazepine      Dilantin [Phenytoin]      Sulfa Drugs        Physical Exam   Vital Signs: Temp: 97.3  F (36.3  C) Temp src: Temporal BP: 121/66 Pulse: 98   Resp: 29 SpO2: 95 % O2 Device: None (Room air)    Weight: 203 lbs .7 oz      Vital signs reviewed  General:  Alert, calm, NAD  CV: regular rate  and rhythm, no murmurs or rubs  Lungs:  Clear to ascultation bilaterally, normal respiratory effort  HEENT:  Pupil round, equal, conjuctivae, sclerae and lids normal, neck is supple  Abdomen:  Soft, nontender, nondistended, no masses, normal bowel sounds  Extremities:  No edema  Neuro: normal strength and sensation in all 4 extremities, cranial nerves grossly intact  Psychiatric:  Mood and affect within normal limits  Skin:  No rashes or wounds evident    Data   Data reviewed today: I reviewed all medications, new labs and imaging results over the last 24 hours.     Recent Labs   Lab 01/26/21  1310   WBC 8.5   HGB 14.7   MCV 96      INR 0.91      POTASSIUM 3.2*   CHLORIDE 108   CO2 28   BUN 10   CR 0.96   ANIONGAP 5   MERLY 9.4   *   TROPI <0.015     Recent Results (from the past 24 hour(s))   XR Chest Port 1 View    Narrative    CHEST PORTABLE ONE VIEW   1/26/2021 4:17 PM     HISTORY: Short of breath.    COMPARISON: Chest x-ray on 3/10/2016.      Impression    IMPRESSION: Single portable AP view of the chest was obtained. Stable  cardiomediastinal silhouette. Bilateral perihilar predominant  interstitial pulmonary opacities, left worse than right, could  represent pulmonary edema versus infection. No significant pleural  effusion or pneumothorax.    SHAHBAZ FRANCOIS MD   CT Chest Pulmonary Embolism w Contrast    Narrative    EXAM: CT CHEST PULMONARY EMBOLISM W CONTRAST  LOCATION: [facility]  DATE/TIME: 1/26/2021 4:49 PM    INDICATION: Shortness of breath.  COMPARISON: None.  TECHNIQUE: CT chest pulmonary angiogram during arterial phase injection of IV contrast. Multiplanar reformats and MIP reconstructions were performed. Dose reduction techniques were used.   CONTRAST: 67 mL Isovue-370.    FINDINGS:  ANGIOGRAM CHEST: There is a small focal isolated nonocclusive embolus seen within a segmental pulmonary arterial branch to the superior segment of the left lower lobe. No other emboli  are visualized. No pulmonary arterial enlargement. No evidence for   right heart strain. Thoracic aorta is negative for dissection or aneurysm.    LUNGS AND PLEURA: There are multifocal regions of geographic small ground-glass infiltrates throughout the lungs typical of COVID-19 pneumonia.    MEDIASTINUM/AXILLAE: Mildly prominent but subcentimeter nodes.    UPPER ABDOMEN: Left nephrectomy.    MUSCULOSKELETAL: Normal.      Impression    IMPRESSION:  1.  Small focal isolated nonocclusive embolus within a superior segment pulmonary arterial segmental branch superior segment left lower lobe.    2.  Multiple small geographic regions of ground-glass infiltrate throughout the lungs typical of COVID-19 pneumonia.    3.  Previous left nephrectomy.    Results called to Krista Tristan

## 2021-01-27 NOTE — PLAN OF CARE
Patient's After Visit Summary was reviewed with patient   Patient verbalized understanding of After Visit Summary, recommended follow up and was given an opportunity to ask questions.   Discharge medications sent home with patient/family: YES- Eliquis   Discharged with spouse.    VSS on RA. IV removed. AVS reviewed with patient, who expressed understanding and denied having any questions. Support staff brought patient to the front door, where her  picked her up.

## 2021-01-27 NOTE — PLAN OF CARE
PRIMARY DIAGNOSIS: SOB, COVID, NEAR SYNCOPE, ACUTE PULMONARY EMBOLISM.  OUTPATIENT/OBSERVATION GOALS TO BE MET BEFORE DISCHARGE:  1. Vital signs stable: Yes    2. Dyspnea improved and O2 sats >88% at RA or at prior home O2 therapy level: Yes      SpO2: 97 %, O2 Device: None (Room air)    3. Short term supplemental O2 needed for use with activity at home: No    4. Tolerating adequate PO diet and medications: Yes    5. Return to near baseline physical activity: Yes    Vitals are Temp: 97  F (36.1  C) Temp src: Oral BP: (!) 143/66 Pulse: 78   Resp: 16 SpO2: 95 %.  Patient is Alert and Oriented x4. She is SBA with no assistive devices .  Patient is on Droplet precuations for Covid.  Pt is a Regular diet. She is denying pain.  Patient is Saline locked. Possible discharge home today if Oxygen status is stable and coughing is improving.     . Discharge Planner Nurse   Safe discharge environment identified: Yes  Barriers to discharge: Yes       Entered by: Zuleika Castillo 01/27/2021 5:13 AM     Please review provider order for any additional goals.   Nurse to notify provider when observation goals have been met and patient is ready for discharge.

## 2021-01-27 NOTE — PHARMACY-RX INSURANCE COVERAGE
Anticoagulation coverage check.  Patient has Medicare D through [facility] with $235 (of $235) unmet deductible.    Xarelto/Eliquis   January: Upon receipt of RX, Discharge Pharmacy can dispense 1 month free.   February: $282 (fulfills $235 deductible)   Mar-Sept: $47/mo   Oct-Dec: $122/mo (coverage gap)     Enoxaparin 100mg x 10 syringes: $20.    Jantoven (warfarin): $7/mo.      -DELVIN Shaw, Pharmacy Technician/Liaison, [facility]acy [phone]

## 2021-01-27 NOTE — PHARMACY-ADMISSION MEDICATION HISTORY
Admission medication history interview status for this patient is complete. See [facility] admission navigator for allergy information, prior to admission medications and immunization status.     Medication history interview done via telephone during Covid-19 pandemic, indicate source(s): Patient  Medication history resources (including written lists, pill bottles, clinic record):None  Pharmacy: [facility]    Changes made to PTA medication list:  Added: sumatripan, propranolol, vitamin D   Deleted: ibuprofen, robaxin, temazepam   Changed: none    Actions taken by pharmacist (provider contacted, etc):None     Additional medication history information:None    Medication reconciliation/reorder completed by provider prior to medication history?  N   (Y/N)      Prior to Admission medications    Medication Sig Last Dose Taking? Auth Provider   acetaminophen (TYLENOL) 325 MG tablet Take 2 tablets (650 mg) by mouth every 4 hours as needed Past Month at Unknown time Yes Natan Pereira MD   aspirin 325 MG tablet Take 325 mg by mouth every evening  1/25/2021 at Unknown time Yes Unknown, Entered By History   DULoxetine (CYMBALTA) 60 MG capsule Take 60 mg by mouth daily  Past Month at Unknown time Yes Reported, Patient   oxybutynin (DITROPAN-XL) 10 MG 24 hr tablet Take 10 mg by mouth daily  Past Month at Unknown time Yes Reported, Patient   propranolol ER (INDERAL LA) 160 MG 24 hr capsule Take 160 mg by mouth daily Past Month at Unknown time Yes Unknown, Entered By History   simvastatin (ZOCOR) 40 MG tablet Take 40 mg by mouth At Bedtime. 1/25/2021 at Unknown time Yes Unknown, Entered By History   vitamin D3 (CHOLECALCIFEROL) 50 mcg (2000 units) tablet Take 1 tablet by mouth daily Past Month at Unknown time Yes Unknown, Entered By History   SUMAtriptan (IMITREX) 25 MG tablet Take 25 mg by mouth at onset of headache for migraine Unknown at hasnt used yet or needed  Unknown, Entered By History

## 2021-05-05 ENCOUNTER — HOSPITAL ENCOUNTER (OUTPATIENT)
Dept: MAMMOGRAPHY | Facility: CLINIC | Age: 66
Discharge: HOME | End: 2021-05-05
Attending: FAMILY MEDICINE | Admitting: FAMILY MEDICINE
Payer: COMMERCIAL

## 2021-05-05 DIAGNOSIS — Z12.31 VISIT FOR SCREENING MAMMOGRAM: ICD-10-CM

## 2021-05-05 PROCEDURE — 77067 SCR MAMMO BI INCL CAD: CPT

## 2022-08-12 ENCOUNTER — HOSPITAL ENCOUNTER (EMERGENCY)
Facility: CLINIC | Age: 67
Discharge: HOME | End: 2022-08-12
Attending: EMERGENCY MEDICINE | Admitting: EMERGENCY MEDICINE
Payer: COMMERCIAL

## 2022-08-12 ENCOUNTER — APPOINTMENT (OUTPATIENT)
Dept: GENERAL RADIOLOGY | Facility: CLINIC | Age: 67
End: 2022-08-12
Attending: EMERGENCY MEDICINE
Payer: COMMERCIAL

## 2022-08-12 VITALS
WEIGHT: 235 LBS | SYSTOLIC BLOOD PRESSURE: 128 MMHG | OXYGEN SATURATION: 100 % | RESPIRATION RATE: 18 BRPM | TEMPERATURE: 97.5 F | DIASTOLIC BLOOD PRESSURE: 72 MMHG | BODY MASS INDEX: 40.34 KG/M2 | HEART RATE: 57 BPM

## 2022-08-12 DIAGNOSIS — R53.83 OTHER FATIGUE: ICD-10-CM

## 2022-08-12 DIAGNOSIS — R06.00 DYSPNEA, UNSPECIFIED TYPE: ICD-10-CM

## 2022-08-12 LAB
ALBUMIN SERPL BCG-MCNC: 4.1 G/DL (ref 3.5–5.2)
ALP SERPL-CCNC: 86 U/L (ref 35–104)
ALT SERPL W P-5'-P-CCNC: 32 U/L (ref 10–35)
ANION GAP SERPL CALCULATED.3IONS-SCNC: 9 MMOL/L (ref 7–15)
AST SERPL W P-5'-P-CCNC: 30 U/L (ref 10–35)
BASOPHILS # BLD AUTO: 0.1 10E3/UL (ref 0–0.2)
BASOPHILS NFR BLD AUTO: 1 %
BILIRUB DIRECT SERPL-MCNC: <0.2 MG/DL (ref 0–0.3)
BILIRUB SERPL-MCNC: 0.3 MG/DL
BUN SERPL-MCNC: 17.3 MG/DL (ref 8–23)
CALCIUM SERPL-MCNC: 9.4 MG/DL (ref 8.8–10.2)
CHLORIDE SERPL-SCNC: 105 MMOL/L (ref 98–107)
CREAT SERPL-MCNC: 1.23 MG/DL (ref 0.51–0.95)
D DIMER PPP FEU-MCNC: 0.52 UG/ML FEU (ref 0–0.5)
DEPRECATED HCO3 PLAS-SCNC: 26 MMOL/L (ref 22–29)
EOSINOPHIL # BLD AUTO: 0.2 10E3/UL (ref 0–0.7)
EOSINOPHIL NFR BLD AUTO: 4 %
ERYTHROCYTE [DISTWIDTH] IN BLOOD BY AUTOMATED COUNT: 12.1 % (ref 10–15)
FLUAV RNA SPEC QL NAA+PROBE: NEGATIVE
FLUBV RNA RESP QL NAA+PROBE: NEGATIVE
GFR SERPL CREATININE-BSD FRML MDRD: 48 ML/MIN/1.73M2
GLUCOSE SERPL-MCNC: 101 MG/DL (ref 70–99)
HCT VFR BLD AUTO: 43.8 % (ref 35–47)
HGB BLD-MCNC: 13.5 G/DL (ref 11.7–15.7)
HOLD SPECIMEN: NORMAL
HOLD SPECIMEN: NORMAL
IMM GRANULOCYTES # BLD: 0 10E3/UL
IMM GRANULOCYTES NFR BLD: 0 %
LYMPHOCYTES # BLD AUTO: 2.2 10E3/UL (ref 0.8–5.3)
LYMPHOCYTES NFR BLD AUTO: 34 %
MCH RBC QN AUTO: 30.8 PG (ref 26.5–33)
MCHC RBC AUTO-ENTMCNC: 30.8 G/DL (ref 31.5–36.5)
MCV RBC AUTO: 100 FL (ref 78–100)
MONOCYTES # BLD AUTO: 0.7 10E3/UL (ref 0–1.3)
MONOCYTES NFR BLD AUTO: 10 %
NEUTROPHILS # BLD AUTO: 3.3 10E3/UL (ref 1.6–8.3)
NEUTROPHILS NFR BLD AUTO: 51 %
NRBC # BLD AUTO: 0 10E3/UL
NRBC BLD AUTO-RTO: 0 /100
NT-PROBNP SERPL-MCNC: 264 PG/ML (ref 0–900)
PLATELET # BLD AUTO: 207 10E3/UL (ref 150–450)
POTASSIUM SERPL-SCNC: 4.6 MMOL/L (ref 3.4–5.3)
PROT SERPL-MCNC: 7.2 G/DL (ref 6.4–8.3)
RBC # BLD AUTO: 4.39 10E6/UL (ref 3.8–5.2)
RSV RNA SPEC NAA+PROBE: NEGATIVE
SARS-COV-2 RNA RESP QL NAA+PROBE: NEGATIVE
SODIUM SERPL-SCNC: 140 MMOL/L (ref 136–145)
T4 FREE SERPL-MCNC: 1.17 NG/DL (ref 0.9–1.7)
TROPONIN T SERPL HS-MCNC: 10 NG/L
TSH SERPL DL<=0.005 MIU/L-ACNC: 6.58 UIU/ML (ref 0.3–4.2)
WBC # BLD AUTO: 6.5 10E3/UL (ref 4–11)

## 2022-08-12 PROCEDURE — 87637 SARSCOV2&INF A&B&RSV AMP PRB: CPT | Performed by: EMERGENCY MEDICINE

## 2022-08-12 PROCEDURE — C9803 HOPD COVID-19 SPEC COLLECT: HCPCS

## 2022-08-12 PROCEDURE — 82248 BILIRUBIN DIRECT: CPT | Performed by: EMERGENCY MEDICINE

## 2022-08-12 PROCEDURE — 85379 FIBRIN DEGRADATION QUANT: CPT | Performed by: EMERGENCY MEDICINE

## 2022-08-12 PROCEDURE — 93005 ELECTROCARDIOGRAM TRACING: CPT

## 2022-08-12 PROCEDURE — 84484 ASSAY OF TROPONIN QUANT: CPT | Performed by: EMERGENCY MEDICINE

## 2022-08-12 PROCEDURE — 84443 ASSAY THYROID STIM HORMONE: CPT | Performed by: EMERGENCY MEDICINE

## 2022-08-12 PROCEDURE — 83880 ASSAY OF NATRIURETIC PEPTIDE: CPT | Performed by: EMERGENCY MEDICINE

## 2022-08-12 PROCEDURE — 80053 COMPREHEN METABOLIC PANEL: CPT | Performed by: EMERGENCY MEDICINE

## 2022-08-12 PROCEDURE — 99285 EMERGENCY DEPT VISIT HI MDM: CPT | Mod: 25

## 2022-08-12 PROCEDURE — 85025 COMPLETE CBC W/AUTO DIFF WBC: CPT | Performed by: EMERGENCY MEDICINE

## 2022-08-12 PROCEDURE — 84439 ASSAY OF FREE THYROXINE: CPT | Performed by: EMERGENCY MEDICINE

## 2022-08-12 PROCEDURE — 85041 AUTOMATED RBC COUNT: CPT | Performed by: EMERGENCY MEDICINE

## 2022-08-12 PROCEDURE — 80048 BASIC METABOLIC PNL TOTAL CA: CPT | Performed by: EMERGENCY MEDICINE

## 2022-08-12 PROCEDURE — 36415 COLL VENOUS BLD VENIPUNCTURE: CPT | Performed by: EMERGENCY MEDICINE

## 2022-08-12 PROCEDURE — 85014 HEMATOCRIT: CPT | Performed by: EMERGENCY MEDICINE

## 2022-08-12 PROCEDURE — 71046 X-RAY EXAM CHEST 2 VIEWS: CPT

## 2022-08-12 ASSESSMENT — ACTIVITIES OF DAILY LIVING (ADL): ADLS_ACUITY_SCORE: 33

## 2022-08-12 NOTE — ED TRIAGE NOTES
Presents to triage with c/o fatigue, cough, and dyspnea with exertion for past several months. Patient stated that she called clinic to make an appointment and was referred to the ED for evaluation. Hx of blood clot, no longer on thinners.      Triage Assessment     Row Name 08/12/22 4134       Triage Assessment (Adult)    Airway WDL WDL       Respiratory WDL    Respiratory WDL X;all    Rhythm/Pattern, Respiratory dyspnea on exertion       Cardiac WDL    Cardiac WDL WDL

## 2022-08-13 NOTE — DISCHARGE INSTRUCTIONS
Return for new or worsening symptoms, high fevers, joint pains, new concerns.  Follow-up with your doctor.  The D-dimer screening test for blood clots was within normal range based on your age. Your blood work also did not show signs of heart damage or  other emergent concerns.  Your chest x-ray was normal as well.

## 2022-08-14 LAB
ATRIAL RATE - MUSE: 50 BPM
DIASTOLIC BLOOD PRESSURE - MUSE: NORMAL MMHG
INTERPRETATION ECG - MUSE: NORMAL
P AXIS - MUSE: 51 DEGREES
PR INTERVAL - MUSE: 142 MS
QRS DURATION - MUSE: 74 MS
QT - MUSE: 470 MS
QTC - MUSE: 428 MS
R AXIS - MUSE: -7 DEGREES
SYSTOLIC BLOOD PRESSURE - MUSE: NORMAL MMHG
T AXIS - MUSE: 19 DEGREES
VENTRICULAR RATE- MUSE: 50 BPM

## 2023-02-13 ENCOUNTER — HOSPITAL ENCOUNTER (EMERGENCY)
Facility: CLINIC | Age: 68
Discharge: HOME | End: 2023-02-13
Attending: EMERGENCY MEDICINE | Admitting: EMERGENCY MEDICINE
Payer: COMMERCIAL

## 2023-02-13 ENCOUNTER — APPOINTMENT (OUTPATIENT)
Dept: GENERAL RADIOLOGY | Facility: CLINIC | Age: 68
End: 2023-02-13
Attending: EMERGENCY MEDICINE
Payer: COMMERCIAL

## 2023-02-13 VITALS
BODY MASS INDEX: 41.2 KG/M2 | HEART RATE: 62 BPM | DIASTOLIC BLOOD PRESSURE: 68 MMHG | OXYGEN SATURATION: 100 % | TEMPERATURE: 98.3 F | WEIGHT: 240 LBS | SYSTOLIC BLOOD PRESSURE: 124 MMHG | RESPIRATION RATE: 18 BRPM

## 2023-02-13 DIAGNOSIS — R07.89 CHEST DISCOMFORT: ICD-10-CM

## 2023-02-13 LAB
ANION GAP SERPL CALCULATED.3IONS-SCNC: 9 MMOL/L (ref 7–15)
BASOPHILS # BLD AUTO: 0.1 10E3/UL (ref 0–0.2)
BASOPHILS NFR BLD AUTO: 1 %
BUN SERPL-MCNC: 17 MG/DL (ref 8–23)
CALCIUM SERPL-MCNC: 9.7 MG/DL (ref 8.8–10.2)
CHLORIDE SERPL-SCNC: 106 MMOL/L (ref 98–107)
CREAT SERPL-MCNC: 1.13 MG/DL (ref 0.51–0.95)
D DIMER PPP FEU-MCNC: 0.41 UG/ML FEU (ref 0–0.5)
DEPRECATED HCO3 PLAS-SCNC: 24 MMOL/L (ref 22–29)
EOSINOPHIL # BLD AUTO: 0.2 10E3/UL (ref 0–0.7)
EOSINOPHIL NFR BLD AUTO: 3 %
ERYTHROCYTE [DISTWIDTH] IN BLOOD BY AUTOMATED COUNT: 12.1 % (ref 10–15)
GFR SERPL CREATININE-BSD FRML MDRD: 53 ML/MIN/1.73M2
GLUCOSE SERPL-MCNC: 96 MG/DL (ref 70–99)
HCT VFR BLD AUTO: 44.9 % (ref 35–47)
HGB BLD-MCNC: 14.4 G/DL (ref 11.7–15.7)
IMM GRANULOCYTES # BLD: 0 10E3/UL
IMM GRANULOCYTES NFR BLD: 0 %
LYMPHOCYTES # BLD AUTO: 2.5 10E3/UL (ref 0.8–5.3)
LYMPHOCYTES NFR BLD AUTO: 34 %
MCH RBC QN AUTO: 31.2 PG (ref 26.5–33)
MCHC RBC AUTO-ENTMCNC: 32.1 G/DL (ref 31.5–36.5)
MCV RBC AUTO: 97 FL (ref 78–100)
MONOCYTES # BLD AUTO: 0.6 10E3/UL (ref 0–1.3)
MONOCYTES NFR BLD AUTO: 8 %
NEUTROPHILS # BLD AUTO: 3.9 10E3/UL (ref 1.6–8.3)
NEUTROPHILS NFR BLD AUTO: 54 %
NRBC # BLD AUTO: 0 10E3/UL
NRBC BLD AUTO-RTO: 0 /100
PLATELET # BLD AUTO: 209 10E3/UL (ref 150–450)
POTASSIUM SERPL-SCNC: 4.4 MMOL/L (ref 3.4–5.3)
RBC # BLD AUTO: 4.62 10E6/UL (ref 3.8–5.2)
SODIUM SERPL-SCNC: 139 MMOL/L (ref 136–145)
TROPONIN T SERPL HS-MCNC: 8 NG/L
WBC # BLD AUTO: 7.2 10E3/UL (ref 4–11)

## 2023-02-13 PROCEDURE — 85025 COMPLETE CBC W/AUTO DIFF WBC: CPT | Performed by: EMERGENCY MEDICINE

## 2023-02-13 PROCEDURE — 71046 X-RAY EXAM CHEST 2 VIEWS: CPT

## 2023-02-13 PROCEDURE — 93005 ELECTROCARDIOGRAM TRACING: CPT

## 2023-02-13 PROCEDURE — 84484 ASSAY OF TROPONIN QUANT: CPT | Performed by: EMERGENCY MEDICINE

## 2023-02-13 PROCEDURE — 36415 COLL VENOUS BLD VENIPUNCTURE: CPT | Performed by: EMERGENCY MEDICINE

## 2023-02-13 PROCEDURE — 85379 FIBRIN DEGRADATION QUANT: CPT | Performed by: EMERGENCY MEDICINE

## 2023-02-13 PROCEDURE — 99285 EMERGENCY DEPT VISIT HI MDM: CPT | Mod: 25

## 2023-02-13 PROCEDURE — 80048 BASIC METABOLIC PNL TOTAL CA: CPT | Performed by: EMERGENCY MEDICINE

## 2023-02-13 ASSESSMENT — ACTIVITIES OF DAILY LIVING (ADL)
ADLS_ACUITY_SCORE: 33
ADLS_ACUITY_SCORE: 35

## 2023-02-13 ASSESSMENT — ENCOUNTER SYMPTOMS: ABDOMINAL PAIN: 0

## 2023-02-13 NOTE — ED NOTES
Rapid Assessment Note    History:   Smita Vazquez is a 67 year old female who presents with intermittent chest pain which she describes as burning felt the central chest that started last night and at one point woke her from sleep.  Currently she is chest pain-free but has recurred several times throughout the day.  No fever or worsening cough.  Patient has a history of venous thromboembolism but is not currently on anticoagulation.    Exam:   General:  Alert, interactive  Cardiovascular:  Well perfused  Lungs:  No respiratory distress, no accessory muscle use  Neuro:  Moving all 4 extremities  Skin:  Warm, dry  Psych:  Normal affect      Plan of Care:   I evaluated the patient and developed an initial plan of care. I discussed this plan and explained that I, or one of my partners, would be returning to complete the evaluation.           2/13/2023  [employer]    Portions of this medical record were completed by a scribe. UPON MY REVIEW AND AUTHENTICATION BY ELECTRONIC SIGNATURE, this confirms (a) I performed the applicable clinical services, and (b) the record is accurate.        Brenden Tabor MD  02/13/23 3227

## 2023-02-13 NOTE — ED TRIAGE NOTES
Intermittent midsternal chest discomfort since Saturday night. Worse at night. Has taken tums, no relief. Some dizziness associated with discomfort. Denies fever, SOB. VSS. ABCs intact. A/Ox4.

## 2023-02-14 LAB
ATRIAL RATE - MUSE: 59 BPM
DIASTOLIC BLOOD PRESSURE - MUSE: NORMAL MMHG
INTERPRETATION ECG - MUSE: NORMAL
P AXIS - MUSE: 84 DEGREES
PR INTERVAL - MUSE: 152 MS
QRS DURATION - MUSE: 72 MS
QT - MUSE: 438 MS
QTC - MUSE: 433 MS
R AXIS - MUSE: 30 DEGREES
SYSTOLIC BLOOD PRESSURE - MUSE: NORMAL MMHG
T AXIS - MUSE: 47 DEGREES
VENTRICULAR RATE- MUSE: 59 BPM

## 2023-02-14 NOTE — DISCHARGE INSTRUCTIONS
Please consider taking an over-the-counter antacid such as Prilosec or Pepcid or omeprazole or pantoprazole.  Take this medication daily for few weeks.  Follow the instructions on the box.    Discharge Instructions  Chest Pain    You have been seen today for chest pain or discomfort.  At this time, your provider has found no signs that your chest pain is due to a serious or life-threatening condition, (or you have declined more testing and/or admission to the hospital). However, sometimes there is a serious problem that does not show up right away. Your evaluation today may not be complete and you may need further testing and evaluation.     Generally, every Emergency Department visit should have a follow-up clinic visit with either a primary or a specialty clinic/provider. Please follow-up as instructed by your emergency provider today.  Return to the Emergency Department if:  Your chest pain changes, gets worse, starts to happen more often, or comes with less activity.  You are newly short of breath.  You get very weak or tired.  You pass out or faint.  You have any new symptoms, like fever, cough, numb legs, or you cough up blood.  You have anything else that worries you.    Until you follow-up with your regular provider, please do the following:  If you have questions, contact your regular provider.  Follow-up with your regular provider/clinic as directed; this is very important.    If you were given a prescription for medicine here today, be sure to read all of the information (including the package insert) that comes with your prescription.  This will include important information about the medicine, its side effects, and any warnings that you need to know about.  The pharmacist who fills the prescription can provide more information and answer questions you may have about the medicine.  If you have questions or concerns that the pharmacist cannot address, please call or return to the Emergency Department.        Remember that you can always come back to the Emergency Department if you are not able to see your regular provider in the amount of time listed above, if you get any new symptoms, or if there is anything that worries you.

## 2023-02-14 NOTE — ED PROVIDER NOTES
History     Chief Complaint:  Chest Pain    The history is provided by the patient.      Smita Vazquez is a 67 year old female with a history of pulmonary embolism, stage 3 chronic renal disease, hyperlipidemia, and hypertension who presents with chest pain. The patient reports onset of mid sternal chest pain that radiated into her throat Saturday night (2 days ago) after eating pizza for dinner and having a couple of alcoholic beverages. Adds that she ate pizza again yesterday while watching the Super Bowl but did not drink alcohol. Reports she woke up at 0200 this morning with similar chest pain and took 2 Tums which did not relieve her symptoms. Notes that she called the nurse line and was told to come into the emergency department due to her history of pulmonary embolism. States that she is currently not experiencing the chest pain and notes the episode at 0200 was the last time she felt the pain. Denies abdominal pain.    Independent Historian:   None - Patient Only    ROS:  Review of Systems   Cardiovascular: Positive for chest pain.   Gastrointestinal: Negative for abdominal pain.   All other systems reviewed and are negative.    Allergies:  Carbamazepine  Dilantin [Phenytoin]  Sulfa Drugs     Medications:    Aspirin 325 MG  Duloxetine  Oxybutynin  Propranolol  Simvastatin  Sumatriptan     Past Medical History:    High cholesterol  Hypertension   Ventricular tachycardia   Acute pulmonary embolism   Anxiety  Stage 3 chronic renal disease  GERD  Overactive bladder  Depression  Insomnia   Kidney donation     Past Surgical History:    Kidney donation, left  Hysterectomy    Bladder repair  Tonsillectomy     Family History:    Mother- anxiety     Social History:  Presents alone  Presents via private vehicle  Former smoker    Physical Exam     Patient Vitals for the past 24 hrs:   BP Temp Temp src Pulse Resp SpO2 Weight   02/13/23 2008 -- -- -- -- -- 100 % --   02/13/23 2007 129/65 -- -- 51 -- -- --   02/13/23  1433 (!) 150/91 98.3  F (36.8  C) Temporal 59 18 100 % 108.9 kg (240 lb)      Physical Exam  VS: Reviewed per above  HENT: normal speech  EYES: sclera anicteric  CV: Rate as noted, regular rhythm.   RESP: Effort normal. Breath sounds are normal bilaterally.  GI: no tenderness/rebound/guarding, not distended.  NEURO: Alert, moving all extremities  MSK: No deformity of the extremities  SKIN: Warm and dry    Emergency Department Course   ECG  ECG taken at 1454, ECG read at 1458  Sinus bradycardia    No change as compared to prior, dated 8/12/22.  Rate 59 bpm. FL interval 152 ms. QRS duration 72 ms. QT/QTc 438/433 ms. P-R-T axes 84 30 47.     Imaging:  XR Chest 2 Views   Final Result   IMPRESSION: Negative chest.         Report per radiology    Laboratory:  Labs Ordered and Resulted from Time of ED Arrival to Time of ED Departure   BASIC METABOLIC PANEL - Abnormal       Result Value    Sodium 139      Potassium 4.4      Chloride 106      Carbon Dioxide (CO2) 24      Anion Gap 9      Urea Nitrogen 17.0      Creatinine 1.13 (*)     Calcium 9.7      Glucose 96      GFR Estimate 53 (*)    D DIMER QUANTITATIVE - Normal    D-Dimer Quantitative 0.41     TROPONIN T, HIGH SENSITIVITY - Normal    Troponin T, High Sensitivity 8     CBC WITH PLATELETS AND DIFFERENTIAL    WBC Count 7.2      RBC Count 4.62      Hemoglobin 14.4      Hematocrit 44.9      MCV 97      MCH 31.2      MCHC 32.1      RDW 12.1      Platelet Count 209      % Neutrophils 54      % Lymphocytes 34      % Monocytes 8      % Eosinophils 3      % Basophils 1      % Immature Granulocytes 0      NRBCs per 100 WBC 0      Absolute Neutrophils 3.9      Absolute Lymphocytes 2.5      Absolute Monocytes 0.6      Absolute Eosinophils 0.2      Absolute Basophils 0.1      Absolute Immature Granulocytes 0.0      Absolute NRBCs 0.0       Emergency Department Course & Assessments:       2000 I obtained history and examined the patient as noted above.     Disposition:  The patient  was discharged to home.     Impression & Plan    Medical Decision Making:  Patient presents to the ER for evaluation of intermittent chest discomfort, worse with eating pizza.  Vital signs reassuring.  History is concerning for esophagitis or reflux but broad differential was considered.  ECG and troponin not suggestive of ACS or myocarditis/pericarditis.  Normal D-dimer speaks against occult PE.  Chest x-ray does not show pneumonia, pneumothorax, signs of aortic dissection.  Plan to start PPI and modify diet.  Recommended ongoing primary care follow-up and return precautions discussed prior to discharge.    Diagnosis:    ICD-10-CM    1. Chest discomfort  R07.89           Scribe Disclosure:  I, Nelia Vizcarra, am serving as a scribe at 8:04 PM on 2/13/2023 to document services personally performed by Olman Mas MD based on my observations and the provider's statements to me.     2/13/2023   Olman Mas MD Lindenbaum, Elan, MD  02/14/23 0005

## 2023-10-09 NOTE — ED PROVIDER NOTES
"  History     Chief Complaint:  Fatigue and Cough       HPI   Smita Vazquez is a 66 year old female with h/o PE who presents with fatigue and episodes of shortness of breath.  She describes a few months of decreased exertional tolerance as well as fatigue.  She does intermittently feel that she needs to catch her breath and describes a \"hitch\", feeling as though she needs to take an extra breath intermittently.  No chest pain.  Her  reports that she has been having a cough recently although it is quite intermittent.  No fevers or vomiting or diarrhea or drug or alcohol use.  No orthopnea or new leg swelling.    ROS:  Review of Systems  A 10 point ROS was obtained and negative except as noted here and in HPI      Allergies:  Carbamazepine  Dilantin [Phenytoin]  Sulfa Drugs     Medications:    acetaminophen (TYLENOL) 325 MG tablet  aspirin 325 MG tablet  DULoxetine (CYMBALTA) 60 MG capsule  oxybutynin (DITROPAN-XL) 10 MG 24 hr tablet  propranolol ER (INDERAL LA) 160 MG 24 hr capsule  simvastatin (ZOCOR) 40 MG tablet  SUMAtriptan (IMITREX) 25 MG tablet  vitamin D3 (CHOLECALCIFEROL) 50 mcg (2000 units) tablet        Past Medical History:    Past Medical History:   Diagnosis Date     High cholesterol      Ventricular tachycardia (H)        Past Surgical History:    Past Surgical History:   Procedure Laterality Date     ABDOMEN SURGERY      kidney donor for son     GYN SURGERY      hysterectomy        Family History:    family history is not on file.    Social History:   reports that she has quit smoking. She does not have any smokeless tobacco history on file. She reports that she does not drink alcohol and does not use drugs.  PCP: Demetri Herrera     Physical Exam     Patient Vitals for the past 24 hrs:   BP Temp Temp src Pulse Resp SpO2 Weight   08/12/22 2032 -- -- -- -- -- 100 % --   08/12/22 2031 128/72 -- -- 57 -- -- --   08/12/22 1655 (!) 143/93 97.5  F (36.4  C) Temporal 62 18 97 % 106.6 kg (235 " lb)        Physical Exam  VS: Reviewed per above  HENT: normal speech  EYES: sclera anicteric  CV: Rate as noted, regular rhythm.   RESP: Effort normal. Breath sounds are normal bilaterally.  NEURO: Alert, moving all extremities  MSK: No deformity of the extremities, no calf ttp nor aysmmetry  SKIN: Warm and dry    Emergency Department Course   ECG  ECG taken at 1911, ECG read at 1913  Sinus bradycardia   Tachycardia has resolved as compared to prior, dated 1/26/2021.  Rate 50 bpm. VT interval 142 ms. QRS duration 74 ms. QT/QTc 470/428 ms. P-R-T axes 51 -7 19.     Imaging:  XR Chest 2 Views   Final Result   IMPRESSION: Negative chest.         Report per radiology    Laboratory:  Labs Ordered and Resulted from Time of ED Arrival to Time of ED Departure   BASIC METABOLIC PANEL - Abnormal       Result Value    Creatinine 1.23 (*)     Sodium 140      Potassium 4.6      Urea Nitrogen 17.3      Chloride 105      Carbon Dioxide (CO2) 26      Anion Gap 9      Glucose 101 (*)     GFR Estimate 48 (*)     Calcium 9.4     CBC WITH PLATELETS AND DIFFERENTIAL - Abnormal    WBC Count 6.5      RBC Count 4.39      Hemoglobin 13.5      Hematocrit 43.8            MCH 30.8      MCHC 30.8 (*)     RDW 12.1      Platelet Count 207      % Neutrophils 51      % Lymphocytes 34      % Monocytes 10      % Eosinophils 4      % Basophils 1      % Immature Granulocytes 0      NRBCs per 100 WBC 0      Absolute Neutrophils 3.3      Absolute Lymphocytes 2.2      Absolute Monocytes 0.7      Absolute Eosinophils 0.2      Absolute Basophils 0.1      Absolute Immature Granulocytes 0.0      Absolute NRBCs 0.0     D DIMER QUANTITATIVE - Abnormal    D-Dimer Quantitative 0.52 (*)    TSH WITH FREE T4 REFLEX - Abnormal    TSH 6.58 (*)    TROPONIN T, HIGH SENSITIVITY - Normal    Troponin T, High Sensitivity 10     INFLUENZA A/B & SARS-COV2 PCR MULTIPLEX - Normal    Influenza A PCR Negative      Influenza B PCR Negative      RSV PCR Negative      SARS  CoV2 PCR Negative     NT PROBNP INPATIENT - Normal    N terminal Pro BNP Inpatient 264     HEPATIC FUNCTION PANEL - Normal    Protein Total 7.2      Albumin 4.1      Bilirubin Total 0.3      Alkaline Phosphatase 86      AST 30      ALT 32      Bilirubin Direct <0.20     T4 FREE - Normal    Free T4 1.17            Emergency Department Course:             Reviewed:  I reviewed nursing notes, vitals and past medical history    Assessments:   I obtained history and examined the patient as noted above.    I rechecked the patient and explained findings.     Disposition:  The patient was discharged to home.     Impression & Plan        Medical Decision Making:  Patient presents to the ER for evaluation of subacute fatigue and intermittent shortness of breath.  On arrival vital signs reassuring.  Exam is unremarkable.  EKG and troponin do not suggest ACS.  No laboratory or clinical signs of heart failure.  D-dimer is age-appropriate, thus low suspicion for PE in conjunction with lower pretest probability.  TSH is slightly elevated but there is normal T4.  COVID and influenza testing are negative.  Chest x-ray is clear.  At this time no emergent etiology of her symptoms is identified.  Encouraged close interval primary care follow-up.  Return precautions discussed prior to discharge.    Diagnosis:    ICD-10-CM    1. Other fatigue  R53.83    2. Dyspnea, unspecified type  R06.00         Discharge Medications:  Discharge Medication List as of 8/12/2022  8:26 PM           8/12/2022   Olman Mas MD Lindenbaum, Elan, MD  08/12/22 2052       Olman Mas MD  08/12/22 2053     Klisyri Counseling:  I discussed with the patient the risks of Klisyri including but not limited to erythema, scaling, itching, weeping, crusting, and pain.

## 2023-10-28 ENCOUNTER — APPOINTMENT (OUTPATIENT)
Dept: GENERAL RADIOLOGY | Facility: CLINIC | Age: 68
End: 2023-10-28
Attending: EMERGENCY MEDICINE
Payer: COMMERCIAL

## 2023-10-28 ENCOUNTER — HOSPITAL ENCOUNTER (EMERGENCY)
Facility: CLINIC | Age: 68
Discharge: HOME | End: 2023-10-28
Attending: EMERGENCY MEDICINE | Admitting: EMERGENCY MEDICINE
Payer: COMMERCIAL

## 2023-10-28 VITALS
DIASTOLIC BLOOD PRESSURE: 72 MMHG | HEIGHT: 64 IN | BODY MASS INDEX: 34.83 KG/M2 | HEART RATE: 50 BPM | TEMPERATURE: 97.8 F | SYSTOLIC BLOOD PRESSURE: 131 MMHG | WEIGHT: 204 LBS | OXYGEN SATURATION: 94 % | RESPIRATION RATE: 22 BRPM

## 2023-10-28 DIAGNOSIS — R07.89 ATYPICAL CHEST PAIN: ICD-10-CM

## 2023-10-28 LAB
ALBUMIN SERPL BCG-MCNC: 4.4 G/DL (ref 3.5–5.2)
ALP SERPL-CCNC: 93 U/L (ref 35–104)
ALT SERPL W P-5'-P-CCNC: 22 U/L (ref 0–50)
ANION GAP SERPL CALCULATED.3IONS-SCNC: 7 MMOL/L (ref 7–15)
AST SERPL W P-5'-P-CCNC: 26 U/L (ref 0–45)
BASOPHILS # BLD AUTO: 0.1 10E3/UL (ref 0–0.2)
BASOPHILS NFR BLD AUTO: 1 %
BILIRUB DIRECT SERPL-MCNC: <0.2 MG/DL (ref 0–0.3)
BILIRUB SERPL-MCNC: 0.4 MG/DL
BUN SERPL-MCNC: 20.4 MG/DL (ref 8–23)
CALCIUM SERPL-MCNC: 9.7 MG/DL (ref 8.8–10.2)
CHLORIDE SERPL-SCNC: 105 MMOL/L (ref 98–107)
CREAT SERPL-MCNC: 1.4 MG/DL (ref 0.51–0.95)
DEPRECATED HCO3 PLAS-SCNC: 26 MMOL/L (ref 22–29)
EGFRCR SERPLBLD CKD-EPI 2021: 41 ML/MIN/1.73M2
EOSINOPHIL # BLD AUTO: 0.2 10E3/UL (ref 0–0.7)
EOSINOPHIL NFR BLD AUTO: 3 %
ERYTHROCYTE [DISTWIDTH] IN BLOOD BY AUTOMATED COUNT: 12.3 % (ref 10–15)
GLUCOSE SERPL-MCNC: 68 MG/DL (ref 70–99)
HCT VFR BLD AUTO: 43.4 % (ref 35–47)
HGB BLD-MCNC: 13.8 G/DL (ref 11.7–15.7)
HOLD SPECIMEN: NORMAL
HOLD SPECIMEN: NORMAL
IMM GRANULOCYTES # BLD: 0 10E3/UL
IMM GRANULOCYTES NFR BLD: 0 %
LIPASE SERPL-CCNC: 26 U/L (ref 13–60)
LYMPHOCYTES # BLD AUTO: 2.2 10E3/UL (ref 0.8–5.3)
LYMPHOCYTES NFR BLD AUTO: 36 %
MCH RBC QN AUTO: 30.9 PG (ref 26.5–33)
MCHC RBC AUTO-ENTMCNC: 31.8 G/DL (ref 31.5–36.5)
MCV RBC AUTO: 97 FL (ref 78–100)
MONOCYTES # BLD AUTO: 0.5 10E3/UL (ref 0–1.3)
MONOCYTES NFR BLD AUTO: 8 %
NEUTROPHILS # BLD AUTO: 3.2 10E3/UL (ref 1.6–8.3)
NEUTROPHILS NFR BLD AUTO: 52 %
NRBC # BLD AUTO: 0 10E3/UL
NRBC BLD AUTO-RTO: 0 /100
PLATELET # BLD AUTO: 195 10E3/UL (ref 150–450)
POTASSIUM SERPL-SCNC: 4.4 MMOL/L (ref 3.4–5.3)
PROT SERPL-MCNC: 7.3 G/DL (ref 6.4–8.3)
RBC # BLD AUTO: 4.46 10E6/UL (ref 3.8–5.2)
SODIUM SERPL-SCNC: 138 MMOL/L (ref 135–145)
TROPONIN T SERPL HS-MCNC: 11 NG/L
TROPONIN T SERPL HS-MCNC: 8 NG/L
WBC # BLD AUTO: 6.1 10E3/UL (ref 4–11)

## 2023-10-28 PROCEDURE — 99285 EMERGENCY DEPT VISIT HI MDM: CPT | Mod: 25

## 2023-10-28 PROCEDURE — 82248 BILIRUBIN DIRECT: CPT | Performed by: EMERGENCY MEDICINE

## 2023-10-28 PROCEDURE — 250N000013 HC RX MED GY IP 250 OP 250 PS 637: Performed by: EMERGENCY MEDICINE

## 2023-10-28 PROCEDURE — 85025 COMPLETE CBC W/AUTO DIFF WBC: CPT | Performed by: EMERGENCY MEDICINE

## 2023-10-28 PROCEDURE — 93005 ELECTROCARDIOGRAM TRACING: CPT

## 2023-10-28 PROCEDURE — 82310 ASSAY OF CALCIUM: CPT | Performed by: EMERGENCY MEDICINE

## 2023-10-28 PROCEDURE — 71046 X-RAY EXAM CHEST 2 VIEWS: CPT

## 2023-10-28 PROCEDURE — 83690 ASSAY OF LIPASE: CPT | Performed by: EMERGENCY MEDICINE

## 2023-10-28 PROCEDURE — 84484 ASSAY OF TROPONIN QUANT: CPT | Mod: 91 | Performed by: EMERGENCY MEDICINE

## 2023-10-28 PROCEDURE — 36415 COLL VENOUS BLD VENIPUNCTURE: CPT | Performed by: EMERGENCY MEDICINE

## 2023-10-28 RX ORDER — FAMOTIDINE 20 MG/1
20 TABLET, FILM COATED ORAL 2 TIMES DAILY
Qty: 28 TABLET | Refills: 0 | Status: SHIPPED | OUTPATIENT
Start: 2023-10-28 | End: 2023-11-11

## 2023-10-28 RX ORDER — ASPIRIN 81 MG/1
324 TABLET, CHEWABLE ORAL ONCE
Status: DISCONTINUED | OUTPATIENT
Start: 2023-10-28 | End: 2023-10-28

## 2023-10-28 RX ORDER — FAMOTIDINE 20 MG/1
20 TABLET, FILM COATED ORAL ONCE
Status: COMPLETED | OUTPATIENT
Start: 2023-10-28 | End: 2023-10-28

## 2023-10-28 RX ORDER — MAGNESIUM HYDROXIDE/ALUMINUM HYDROXICE/SIMETHICONE 120; 1200; 1200 MG/30ML; MG/30ML; MG/30ML
30 SUSPENSION ORAL ONCE
Status: COMPLETED | OUTPATIENT
Start: 2023-10-28 | End: 2023-10-28

## 2023-10-28 RX ADMIN — ALUMINUM HYDROXIDE, MAGNESIUM HYDROXIDE, AND DIMETHICONE 30 ML: 200; 20; 200 SUSPENSION ORAL at 15:25

## 2023-10-28 RX ADMIN — FAMOTIDINE 20 MG: 20 TABLET ORAL at 15:25

## 2023-10-28 ASSESSMENT — ACTIVITIES OF DAILY LIVING (ADL)
ADLS_ACUITY_SCORE: 35
ADLS_ACUITY_SCORE: 35

## 2023-10-28 NOTE — ED TRIAGE NOTES
Two weeks ago on Thursday had abdominal squeezing around her belly which she felt was associated with GERD. That has continued on and off since that time.     Last night around 0 woke with chest heaviness. Pt took her blood pressure and it was normal, but her heart rate was 47-53. Also pain in her middle back burning in nature. Pain comes and goes. Continues with ache in her chest and slight pain in back.    Denies shortness of breath. Denies that pain is worse with exertion.    17 years ago had chest discomfort and underwent a cardiac stress test. Had v-tach that was captured at that time.     Pt takes propanolol for essential tremors. Mother  of MI at age 68.

## 2023-10-28 NOTE — ED PROVIDER NOTES
"[facility] Provider Note  [facility] Emergency Department  3:10 PM  10/28/2023    Smita Lalaberg  67 year oldfemale    Chief Complaint   Patient presents with    Chest Pain    Back Pain       HPI:    67-year-old female here with 2 weeks of intermittent lower chest/epigastric discomfort.  Last episode was this morning.  No fever, shortness of breath, vomiting, diarrhea, melena, hematochezia.  No exertional component has also not noticed a consistent postprandial component.  Mother  of a heart attack at age 68.        Independent Historian:     None     Review of External Notes:     None           ROS: 10 point ROS completed and negative other than mentioned above        Past Medical History:   Diagnosis Date    High cholesterol     Ventricular tachycardia (H)      Past Surgical History:   Procedure Laterality Date    ABDOMEN SURGERY      kidney donor for son    GYN SURGERY      hysterectomy           Current Outpatient Medications   Medication Instructions    acetaminophen (TYLENOL) 650 mg, Oral, EVERY 4 HOURS PRN    aspirin (ASA) 325 mg, Oral, EVERY EVENING    DULoxetine (CYMBALTA) 60 mg, Oral, DAILY    famotidine (PEPCID) 20 mg, Oral, 2 TIMES DAILY    oxyBUTYnin ER (DITROPAN XL) 10 mg, Oral, DAILY    propranolol ER (INDERAL LA) 160 mg, Oral, DAILY    simvastatin (ZOCOR) 40 mg, AT BEDTIME    SUMAtriptan (IMITREX) 25 mg, Oral, AT ONSET OF HEADACHE    vitamin D3 (CHOLECALCIFEROL) 50 mcg (2000 units) tablet 1 tablet, Oral, DAILY            Allergies   Allergen Reactions    Carbamazepine     Dilantin [Phenytoin]     Sulfa Antibiotics          Physical Exam  Vitals: /60   Pulse 59   Temp 97.8  F (36.6  C) (Temporal)   Resp 27   Ht 1.626 m (5' 4\")   Wt 92.5 kg (204 lb)   SpO2 99%   BMI 35.02 kg/m      HEENT:  mmm, no rhinorrhea  Neck: supple, no abnormal swelling  Lungs:  CTAB,  no resp distress  CV: rrr, no m/r/g, ppi  Abd: soft, nontender, nondistended, no rebound/masses/guarding/hsm  Ext: no " peripheral edema  Skin: warm, dry, well perfused, no rashes/bruising/lesions on exposed skin  Neuro: alert, MAEE, no gross motor or sensory deficits, gait stable  Psych: Normal mood, normal affect      Labs and Imaging:    Labs Ordered and Resulted from Time of ED Arrival to Time of ED Departure   BASIC METABOLIC PANEL - Abnormal       Result Value    Sodium 138      Potassium 4.4      Chloride 105      Carbon Dioxide (CO2) 26      Anion Gap 7      Urea Nitrogen 20.4      Creatinine 1.40 (*)     GFR Estimate 41 (*)     Calcium 9.7      Glucose 68 (*)    TROPONIN T, HIGH SENSITIVITY - Normal    Troponin T, High Sensitivity 11     HEPATIC FUNCTION PANEL - Normal    Protein Total 7.3      Albumin 4.4      Bilirubin Total 0.4      Alkaline Phosphatase 93      AST 26      ALT 22      Bilirubin Direct <0.20     LIPASE - Normal    Lipase 26     TROPONIN T, HIGH SENSITIVITY - Normal    Troponin T, High Sensitivity 8     CBC WITH PLATELETS AND DIFFERENTIAL    WBC Count 6.1      RBC Count 4.46      Hemoglobin 13.8      Hematocrit 43.4      MCV 97      MCH 30.9      MCHC 31.8      RDW 12.3      Platelet Count 195      % Neutrophils 52      % Lymphocytes 36      % Monocytes 8      % Eosinophils 3      % Basophils 1      % Immature Granulocytes 0      NRBCs per 100 WBC 0      Absolute Neutrophils 3.2      Absolute Lymphocytes 2.2      Absolute Monocytes 0.5      Absolute Eosinophils 0.2      Absolute Basophils 0.1      Absolute Immature Granulocytes 0.0      Absolute NRBCs 0.0            XR Chest 2 Views   Final Result   IMPRESSION: Heart is normal in size. Lungs are clear.             ECG results from 10/28/23   EKG 12 lead     Value    Systolic Blood Pressure     Diastolic Blood Pressure     Ventricular Rate 53    Atrial Rate 53    KY Interval 140    QRS Duration 78        QTc 418    P Axis 59    R AXIS 1    T Axis 37    Interpretation ECG      Sinus bradycardia  Otherwise normal ECG  When compared with ECG of  -2023 14:54,  No significant change was found             Independent Interpretation (X-rays, CTs, rhythm strip):    Chest radiograph without evidence of cardiomegaly, pneumothorax, pleural effusion, pulmonary edema or lobar opacity.    Consultations/Discussion of Management or Tests:    None     Social Determinants of Health affecting care:    None         ED Medications:   Medications   alum & mag hydroxide-simethicone (MAALOX) suspension 30 mL (has no administration in time range)   famotidine (PEPCID) tablet 20 mg (has no administration in time range)           ED Course:             Medical Decision Makin-year-old female here with atypical chest discomfort/epigastric discomfort.  No significant evidence of cardiopulmonary disease probability is extremely low for dissection or pulmonary embolism.  ECG without signs of acute ischemia initial delta troponin negative, abdomen benign on examination low suspicion for intra-abdominal surgical vascular infectious emergency.  May be element of GERD or esophagitis will discharge home symptomatic care patient was comfortable agreeable with that plan will follow-up with her primary care provider and return with new or worsening symptoms.      Diagnosis:    ICD-10-CM    1. Atypical chest pain  R07.89             Disposition:  home      Krishna Flor MD  [facility]  [facility]       Krishna Flor MD  10/28/23 9745

## 2023-10-30 LAB
ATRIAL RATE - MUSE: 53 BPM
DIASTOLIC BLOOD PRESSURE - MUSE: NORMAL MMHG
INTERPRETATION ECG - MUSE: NORMAL
P AXIS - MUSE: 59 DEGREES
PR INTERVAL - MUSE: 140 MS
QRS DURATION - MUSE: 78 MS
QT - MUSE: 446 MS
QTC - MUSE: 418 MS
R AXIS - MUSE: 1 DEGREES
SYSTOLIC BLOOD PRESSURE - MUSE: NORMAL MMHG
T AXIS - MUSE: 37 DEGREES
VENTRICULAR RATE- MUSE: 53 BPM

## 2025-06-12 ENCOUNTER — APPOINTMENT (OUTPATIENT)
Dept: CT IMAGING | Facility: CLINIC | Age: 70
End: 2025-06-12
Attending: EMERGENCY MEDICINE
Payer: COMMERCIAL

## 2025-06-12 ENCOUNTER — APPOINTMENT (OUTPATIENT)
Dept: MRI IMAGING | Facility: CLINIC | Age: 70
End: 2025-06-12
Attending: PHYSICIAN ASSISTANT
Payer: COMMERCIAL

## 2025-06-12 ENCOUNTER — APPOINTMENT (OUTPATIENT)
Dept: CARDIOLOGY | Facility: CLINIC | Age: 70
End: 2025-06-12
Attending: PHYSICIAN ASSISTANT
Payer: COMMERCIAL

## 2025-06-12 ENCOUNTER — HOSPITAL ENCOUNTER (OUTPATIENT)
Facility: CLINIC | Age: 70
Setting detail: OBSERVATION
End: 2025-06-12
Attending: EMERGENCY MEDICINE | Admitting: INTERNAL MEDICINE
Payer: COMMERCIAL

## 2025-06-12 VITALS
BODY MASS INDEX: 38.93 KG/M2 | WEIGHT: 228 LBS | DIASTOLIC BLOOD PRESSURE: 64 MMHG | HEART RATE: 59 BPM | RESPIRATION RATE: 16 BRPM | OXYGEN SATURATION: 93 % | TEMPERATURE: 97.8 F | HEIGHT: 64 IN | SYSTOLIC BLOOD PRESSURE: 105 MMHG

## 2025-06-12 DIAGNOSIS — R20.2 FACIAL PARESTHESIA: ICD-10-CM

## 2025-06-12 DIAGNOSIS — I63.9 ACUTE CVA (CEREBROVASCULAR ACCIDENT) (H): ICD-10-CM

## 2025-06-12 DIAGNOSIS — R47.81 SLURRED SPEECH: ICD-10-CM

## 2025-06-12 DIAGNOSIS — G45.9 TIA (TRANSIENT ISCHEMIC ATTACK): Primary | ICD-10-CM

## 2025-06-12 DIAGNOSIS — R29.898 RIGHT ARM WEAKNESS: ICD-10-CM

## 2025-06-12 DIAGNOSIS — R20.2 ARM PARESTHESIA, RIGHT: ICD-10-CM

## 2025-06-12 LAB
ANION GAP SERPL CALCULATED.3IONS-SCNC: 12 MMOL/L (ref 7–15)
APTT PPP: 26 SECONDS (ref 22–38)
ATRIAL RATE - MUSE: 70 BPM
BASOPHILS # BLD AUTO: 0.1 10E3/UL (ref 0–0.2)
BASOPHILS NFR BLD AUTO: 1 %
BUN SERPL-MCNC: 21.2 MG/DL (ref 8–23)
CALCIUM SERPL-MCNC: 9.6 MG/DL (ref 8.8–10.4)
CHLORIDE SERPL-SCNC: 107 MMOL/L (ref 98–107)
CHOLEST SERPL-MCNC: 142 MG/DL
CREAT SERPL-MCNC: 1.29 MG/DL (ref 0.51–0.95)
DIASTOLIC BLOOD PRESSURE - MUSE: NORMAL MMHG
EGFRCR SERPLBLD CKD-EPI 2021: 44 ML/MIN/1.73M2
EOSINOPHIL # BLD AUTO: 0.2 10E3/UL (ref 0–0.7)
EOSINOPHIL NFR BLD AUTO: 5 %
ERYTHROCYTE [DISTWIDTH] IN BLOOD BY AUTOMATED COUNT: 12.1 % (ref 10–15)
EST. AVERAGE GLUCOSE BLD GHB EST-MCNC: 123 MG/DL
GLUCOSE BLDC GLUCOMTR-MCNC: 140 MG/DL (ref 70–99)
GLUCOSE SERPL-MCNC: 108 MG/DL (ref 70–99)
HBA1C MFR BLD: 5.9 %
HCO3 SERPL-SCNC: 24 MMOL/L (ref 22–29)
HCT VFR BLD AUTO: 43.8 % (ref 35–47)
HDLC SERPL-MCNC: 38 MG/DL
HGB BLD-MCNC: 14.5 G/DL (ref 11.7–15.7)
IMM GRANULOCYTES # BLD: 0 10E3/UL
IMM GRANULOCYTES NFR BLD: 0 %
INR PPP: 0.91 (ref 0.85–1.15)
INTERPRETATION ECG - MUSE: NORMAL
LDLC SERPL CALC-MCNC: 78 MG/DL
LVEF ECHO: NORMAL
LYMPHOCYTES # BLD AUTO: 1.4 10E3/UL (ref 0.8–5.3)
LYMPHOCYTES NFR BLD AUTO: 29 %
MCH RBC QN AUTO: 31.3 PG (ref 26.5–33)
MCHC RBC AUTO-ENTMCNC: 33.1 G/DL (ref 31.5–36.5)
MCV RBC AUTO: 95 FL (ref 78–100)
MONOCYTES # BLD AUTO: 0.4 10E3/UL (ref 0–1.3)
MONOCYTES NFR BLD AUTO: 8 %
NEUTROPHILS # BLD AUTO: 2.7 10E3/UL (ref 1.6–8.3)
NEUTROPHILS NFR BLD AUTO: 57 %
NONHDLC SERPL-MCNC: 104 MG/DL
NRBC # BLD AUTO: 0 10E3/UL
NRBC BLD AUTO-RTO: 0 /100
P AXIS - MUSE: 53 DEGREES
PLATELET # BLD AUTO: 210 10E3/UL (ref 150–450)
POTASSIUM SERPL-SCNC: 4.3 MMOL/L (ref 3.4–5.3)
PR INTERVAL - MUSE: 154 MS
PROTHROMBIN TIME: 12.4 SECONDS (ref 11.8–14.8)
QRS DURATION - MUSE: 80 MS
QT - MUSE: 402 MS
QTC - MUSE: 434 MS
R AXIS - MUSE: -16 DEGREES
RBC # BLD AUTO: 4.63 10E6/UL (ref 3.8–5.2)
SODIUM SERPL-SCNC: 143 MMOL/L (ref 135–145)
SYSTOLIC BLOOD PRESSURE - MUSE: NORMAL MMHG
T AXIS - MUSE: 31 DEGREES
TRIGL SERPL-MCNC: 130 MG/DL
TROPONIN T SERPL HS-MCNC: 8 NG/L
TROPONIN T SERPL HS-MCNC: 9 NG/L
VENTRICULAR RATE- MUSE: 70 BPM
WBC # BLD AUTO: 4.7 10E3/UL (ref 4–11)

## 2025-06-12 PROCEDURE — G0378 HOSPITAL OBSERVATION PER HR: HCPCS

## 2025-06-12 PROCEDURE — 82962 GLUCOSE BLOOD TEST: CPT

## 2025-06-12 PROCEDURE — A9585 GADOBUTROL INJECTION: HCPCS | Performed by: PHYSICIAN ASSISTANT

## 2025-06-12 PROCEDURE — 85610 PROTHROMBIN TIME: CPT | Performed by: EMERGENCY MEDICINE

## 2025-06-12 PROCEDURE — 93306 TTE W/DOPPLER COMPLETE: CPT | Mod: 26 | Performed by: INTERNAL MEDICINE

## 2025-06-12 PROCEDURE — 84484 ASSAY OF TROPONIN QUANT: CPT | Performed by: EMERGENCY MEDICINE

## 2025-06-12 PROCEDURE — 36415 COLL VENOUS BLD VENIPUNCTURE: CPT | Performed by: EMERGENCY MEDICINE

## 2025-06-12 PROCEDURE — 85025 COMPLETE CBC W/AUTO DIFF WBC: CPT | Performed by: EMERGENCY MEDICINE

## 2025-06-12 PROCEDURE — 255N000002 HC RX 255 OP 636: Performed by: PHYSICIAN ASSISTANT

## 2025-06-12 PROCEDURE — 99222 1ST HOSP IP/OBS MODERATE 55: CPT | Performed by: PHYSICIAN ASSISTANT

## 2025-06-12 PROCEDURE — 250N000013 HC RX MED GY IP 250 OP 250 PS 637: Performed by: EMERGENCY MEDICINE

## 2025-06-12 PROCEDURE — 70450 CT HEAD/BRAIN W/O DYE: CPT

## 2025-06-12 PROCEDURE — 70498 CT ANGIOGRAPHY NECK: CPT

## 2025-06-12 PROCEDURE — 93005 ELECTROCARDIOGRAM TRACING: CPT

## 2025-06-12 PROCEDURE — 999N000208 ECHOCARDIOGRAM COMPLETE

## 2025-06-12 PROCEDURE — 82465 ASSAY BLD/SERUM CHOLESTEROL: CPT | Performed by: PHYSICIAN ASSISTANT

## 2025-06-12 PROCEDURE — 0042T CT HEAD PERFUSION W CONTRAST: CPT

## 2025-06-12 PROCEDURE — 250N000011 HC RX IP 250 OP 636: Performed by: EMERGENCY MEDICINE

## 2025-06-12 PROCEDURE — 99285 EMERGENCY DEPT VISIT HI MDM: CPT | Mod: 25

## 2025-06-12 PROCEDURE — 99207 PR NO BILLABLE SERVICE THIS VISIT: CPT

## 2025-06-12 PROCEDURE — 250N000013 HC RX MED GY IP 250 OP 250 PS 637: Performed by: PHYSICIAN ASSISTANT

## 2025-06-12 PROCEDURE — 70553 MRI BRAIN STEM W/O & W/DYE: CPT

## 2025-06-12 PROCEDURE — 80048 BASIC METABOLIC PNL TOTAL CA: CPT | Performed by: EMERGENCY MEDICINE

## 2025-06-12 PROCEDURE — G0425 INPT/ED TELECONSULT30: HCPCS | Mod: G0

## 2025-06-12 PROCEDURE — 85730 THROMBOPLASTIN TIME PARTIAL: CPT | Performed by: EMERGENCY MEDICINE

## 2025-06-12 PROCEDURE — 250N000009 HC RX 250: Performed by: EMERGENCY MEDICINE

## 2025-06-12 PROCEDURE — 83036 HEMOGLOBIN GLYCOSYLATED A1C: CPT | Performed by: PHYSICIAN ASSISTANT

## 2025-06-12 RX ORDER — ACETAMINOPHEN 650 MG/1
650 SUPPOSITORY RECTAL EVERY 4 HOURS PRN
Status: DISCONTINUED | OUTPATIENT
Start: 2025-06-12 | End: 2025-06-13 | Stop reason: HOSPADM

## 2025-06-12 RX ORDER — TOLTERODINE 4 MG/1
4 CAPSULE, EXTENDED RELEASE ORAL DAILY
COMMUNITY

## 2025-06-12 RX ORDER — ASPIRIN 325 MG
325 TABLET ORAL ONCE
Status: COMPLETED | OUTPATIENT
Start: 2025-06-12 | End: 2025-06-12

## 2025-06-12 RX ORDER — ASPIRIN 81 MG/1
81 TABLET, CHEWABLE ORAL DAILY
Status: DISCONTINUED | OUTPATIENT
Start: 2025-06-13 | End: 2025-06-13 | Stop reason: HOSPADM

## 2025-06-12 RX ORDER — LIDOCAINE 40 MG/G
CREAM TOPICAL
Status: DISCONTINUED | OUTPATIENT
Start: 2025-06-12 | End: 2025-06-13 | Stop reason: HOSPADM

## 2025-06-12 RX ORDER — GADOBUTROL 604.72 MG/ML
10.5 INJECTION INTRAVENOUS ONCE
Status: COMPLETED | OUTPATIENT
Start: 2025-06-12 | End: 2025-06-12

## 2025-06-12 RX ORDER — DULOXETIN HYDROCHLORIDE 60 MG/1
60 CAPSULE, DELAYED RELEASE ORAL DAILY
Status: DISCONTINUED | OUTPATIENT
Start: 2025-06-13 | End: 2025-06-13 | Stop reason: HOSPADM

## 2025-06-12 RX ORDER — SIMVASTATIN 20 MG
40 TABLET ORAL AT BEDTIME
Status: DISCONTINUED | OUTPATIENT
Start: 2025-06-12 | End: 2025-06-13

## 2025-06-12 RX ORDER — FAMOTIDINE 20 MG/1
20 TABLET, FILM COATED ORAL DAILY
Status: DISCONTINUED | OUTPATIENT
Start: 2025-06-13 | End: 2025-06-13 | Stop reason: HOSPADM

## 2025-06-12 RX ORDER — ONDANSETRON 4 MG/1
4 TABLET, ORALLY DISINTEGRATING ORAL EVERY 6 HOURS PRN
Status: DISCONTINUED | OUTPATIENT
Start: 2025-06-12 | End: 2025-06-13 | Stop reason: HOSPADM

## 2025-06-12 RX ORDER — ASPIRIN 81 MG/1
81 TABLET ORAL DAILY
Status: DISCONTINUED | OUTPATIENT
Start: 2025-06-13 | End: 2025-06-13 | Stop reason: HOSPADM

## 2025-06-12 RX ORDER — FAMOTIDINE 20 MG/1
20 TABLET, FILM COATED ORAL DAILY
COMMUNITY

## 2025-06-12 RX ORDER — ONDANSETRON 2 MG/ML
4 INJECTION INTRAMUSCULAR; INTRAVENOUS EVERY 6 HOURS PRN
Status: DISCONTINUED | OUTPATIENT
Start: 2025-06-12 | End: 2025-06-13 | Stop reason: HOSPADM

## 2025-06-12 RX ORDER — IOPAMIDOL 755 MG/ML
117 INJECTION, SOLUTION INTRAVASCULAR ONCE
Status: COMPLETED | OUTPATIENT
Start: 2025-06-12 | End: 2025-06-12

## 2025-06-12 RX ORDER — ACETAMINOPHEN 325 MG/1
650 TABLET ORAL EVERY 4 HOURS PRN
Status: DISCONTINUED | OUTPATIENT
Start: 2025-06-12 | End: 2025-06-13 | Stop reason: HOSPADM

## 2025-06-12 RX ORDER — LEVOTHYROXINE SODIUM 50 UG/1
50 TABLET ORAL EVERY EVENING
Status: DISCONTINUED | OUTPATIENT
Start: 2025-06-12 | End: 2025-06-13 | Stop reason: HOSPADM

## 2025-06-12 RX ORDER — TOLTERODINE 4 MG/1
4 CAPSULE, EXTENDED RELEASE ORAL DAILY
Status: DISCONTINUED | OUTPATIENT
Start: 2025-06-13 | End: 2025-06-13 | Stop reason: HOSPADM

## 2025-06-12 RX ORDER — LEVOTHYROXINE SODIUM 50 UG/1
50 TABLET ORAL EVERY EVENING
COMMUNITY

## 2025-06-12 RX ORDER — CLOPIDOGREL BISULFATE 75 MG/1
75 TABLET ORAL DAILY
Status: DISCONTINUED | OUTPATIENT
Start: 2025-06-13 | End: 2025-06-13 | Stop reason: HOSPADM

## 2025-06-12 RX ORDER — ASPIRIN 300 MG/1
300 SUPPOSITORY RECTAL ONCE
Status: DISCONTINUED | OUTPATIENT
Start: 2025-06-12 | End: 2025-06-12

## 2025-06-12 RX ORDER — ACETAMINOPHEN 325 MG/10.15ML
650 LIQUID ORAL EVERY 4 HOURS PRN
Status: DISCONTINUED | OUTPATIENT
Start: 2025-06-12 | End: 2025-06-13 | Stop reason: HOSPADM

## 2025-06-12 RX ORDER — CLOPIDOGREL BISULFATE 75 MG/1
300 TABLET ORAL ONCE
Status: COMPLETED | OUTPATIENT
Start: 2025-06-12 | End: 2025-06-12

## 2025-06-12 RX ADMIN — ASPIRIN 325 MG ORAL TABLET 325 MG: 325 PILL ORAL at 10:28

## 2025-06-12 RX ADMIN — GADOBUTROL 10.5 ML: 604.72 INJECTION INTRAVENOUS at 12:20

## 2025-06-12 RX ADMIN — SIMVASTATIN 40 MG: 20 TABLET, FILM COATED ORAL at 21:10

## 2025-06-12 RX ADMIN — CLOPIDOGREL BISULFATE 300 MG: 75 TABLET, FILM COATED ORAL at 10:28

## 2025-06-12 RX ADMIN — HUMAN ALBUMIN MICROSPHERES AND PERFLUTREN 4 ML: 10; .22 INJECTION, SOLUTION INTRAVENOUS at 13:57

## 2025-06-12 RX ADMIN — IOPAMIDOL 117 ML: 755 INJECTION, SOLUTION INTRAVENOUS at 08:29

## 2025-06-12 RX ADMIN — SODIUM CHLORIDE 95 ML: 9 INJECTION, SOLUTION INTRAVENOUS at 08:29

## 2025-06-12 RX ADMIN — LEVOTHYROXINE SODIUM 50 MCG: 0.05 TABLET ORAL at 19:47

## 2025-06-12 ASSESSMENT — ACTIVITIES OF DAILY LIVING (ADL)
ADLS_ACUITY_SCORE: 40
ADLS_ACUITY_SCORE: 46
ADLS_ACUITY_SCORE: 45
ADLS_ACUITY_SCORE: 45
ADLS_ACUITY_SCORE: 40
ADLS_ACUITY_SCORE: 45
ADLS_ACUITY_SCORE: 40
ADLS_ACUITY_SCORE: 40
ADLS_ACUITY_SCORE: 45

## 2025-06-12 ASSESSMENT — COLUMBIA-SUICIDE SEVERITY RATING SCALE - C-SSRS
2. HAVE YOU ACTUALLY HAD ANY THOUGHTS OF KILLING YOURSELF IN THE PAST MONTH?: NO
1. IN THE PAST MONTH, HAVE YOU WISHED YOU WERE DEAD OR WISHED YOU COULD GO TO SLEEP AND NOT WAKE UP?: NO
6. HAVE YOU EVER DONE ANYTHING, STARTED TO DO ANYTHING, OR PREPARED TO DO ANYTHING TO END YOUR LIFE?: NO

## 2025-06-12 NOTE — PHARMACY-ADMISSION MEDICATION HISTORY
Pharmacist Admission Medication History    Admission medication history is complete. The information provided in this note is only as accurate as the sources available at the time of the update.    Information Source(s): Patient and CareEverywhere/SureScripts via in-person    Pertinent Information:     Changes made to PTA medication list:  Added:   Famotidine  Levothyroxine  Tolterodine  Deleted:   Oxybutynin  Sumatriptan  Changed:    mg >> 81 mg  Propranolol 160 mg >> 220 mg    Allergies reviewed with patient and updates made in EHR: no    Medication History Completed By: Leda Powell RPH 6/12/2025 11:03 AM    PTA Med List   Medication Sig Last Dose/Taking    acetaminophen (TYLENOL) 325 MG tablet Take 2 tablets (650 mg) by mouth every 4 hours as needed Taking As Needed    aspirin 81 MG EC tablet Take 81 mg by mouth every evening. 6/11/2025 Evening    DULoxetine (CYMBALTA) 60 MG capsule Take 60 mg by mouth daily  6/12/2025 Morning    famotidine (PEPCID) 20 MG tablet Take 20 mg by mouth daily. 6/12/2025 Morning    levothyroxine (SYNTHROID/LEVOTHROID) 50 MCG tablet Take 50 mcg by mouth every evening. 6/11/2025 Evening    PROPRANOLOL HCL ER PO Take 220 mg by mouth daily. 160 mg + 60 mg 6/12/2025 Morning    simvastatin (ZOCOR) 40 MG tablet Take 40 mg by mouth At Bedtime. 6/11/2025 Evening    tolterodine ER (DETROL LA) 4 MG 24 hr capsule Take 4 mg by mouth daily. 6/12/2025 Morning    vitamin D3 (CHOLECALCIFEROL) 50 mcg (2000 units) tablet Take 1 tablet by mouth daily 6/12/2025

## 2025-06-12 NOTE — CONSULTS
"[facility]    Stroke Consult Note    Reason for Consult: Right-sided weakness    Chief Complaint: Extremity Weakness     HPI  Smita \"Kari\" BLANCA Vazquez is a right handed 69 year old female with a past medical history of CKD (s/p nephrectomy due to donation), HLD, HTN, hx of L sided bells palsy, essential tremor, hypothyroidism, PE (2021; suspected to be provoked by COVID infection), hx of stroke (per chart review, it was found ?incidentally on MRI brain) and seizure history who presented to the ED this morning with right facial numbness and right upper extremity weakness. Kari reports that she woke up at 7 AM and was able to use the bathroom normally. She then walked downstairs into the kitchen and attempted to pour herself a bowl of cereal when she suddenly noticed her right arm was no longer working. She described it as \"floppy\" and when attempting to use her spoon had to increase her concentration to use her arm. She initially attributed her symptoms to sleeping on her arm  incorrectly, but her symptoms were persisting. She is unsure whether she had arm numbness, but she denied right leg weakness and numbness or speech changes.  Of note, she also developed associated right facial numbness.  She does not recall there being right facial weakness. She denies any prior episodes of her presenting symptoms.  She estimates her symptoms lasted an hour before gradually resolving.  Presenting blood pressure was 130/90. On aspirin 81 mg and simvastatin 40 mg.     At the time of telestroke examination, Kari reports her symptoms are completely resolved.  She denies any new neurologic symptoms.  At the onset of symptoms she denied any chest pain, shortness of breath, or palpitations.  Of note, she has been having chest pain once a week for the last 3 weeks, though this did not happen today.  Kari is a non-smoker. Prior stroke was found incidentally on MRI brain that was done to evaluate essential tremor. " Has a blood pressure cuff at home and has been checking it recently due to a recent change in her propranolol dose.  She denies any recent fevers, chills, unintentional weight loss, night sweats.  She endorses ongoing tiredness/fatigue that has been going on since last summer. She is currently being evaluated by sleep medicine for this.  She endorses a history of V. Tach and previously had an implantable loop recorder for 2 to 3 years.  No prior history of atrial fibrillation.  She reports doing water aerobics 4 times a week.  She also notes increased stress due to family members physical and mental health. She reports being up-to-date on all precancer screenings.  She endorses a family history of stroke in her maternal cousin and maternal great aunt.  Her paternal grandmother also had stroke.    Neuro Evaluation Summarized  MRI and/or Head CT MRI brain: No acute infarct. Chronic lacunar type infarctions and/or perivascular spaces in the basal ganglia and thalami and left cerebral hemisphere.    CT Head: No evidence of hemorrhage or large evolving infarct.   Intracranial Vasculature CTA Head: No LVO or critical stenosis.   Cervical Vasculature CTA Neck: No LVO or critical stenosis.  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 EF 60-65%.  No regional wall motion abnormalities noted. The study was technically difficult. There is no comparison study available.   EKG/Telemetry Sinus Rhythm    Other Testing Not Applicable     LDL No lab value available in past 30 days 1/2025: 85   A1C 6/12/2025: 5.9 % 12/2024: 5.9%     ABCD2 Patients Score   Age >= 60 years 1 point 1   Blood Pressure    SBP >= 140 or DBP >=  90    1 point 1   Clinical Features    - Unilateral weakness    - Speech disturbance w/o  weakness    - Other    2 points  1 point    0 points 2   Duration of symptoms    >= 60 minutes    10-59 minutes    < 10 minutes   2 points  1 point  0 points 2   Diabetes  1 point 0   Patient s ABCD2 Score (0-7) = 6     Impression  Episode of right facial numbness and right upper extremity weakness, now resolved. Suspect possible transient ischemic attack (ABCD2 score 6). MRI brain negative for acute infarct.      Recommendations   - Neuro checks and vital signs every 4 hours  - SBP < 180   - Long term outpatient goal BP is <130/80 to be achieved as outpatient within several weeks. Tighter control associated with improved vascular outcomes; recommend home blood pressure monitoring and keeping a BP log for primary care follow up  - Received aspirin 325 mg and Plavix 300 mg load this morning  - Tomorrow (6/13) Continue Plavix 75 mg and aspirin 81mg daily together for 21 days; then aspirin 81 mg alone indefinitely  - LDL pending (goal 40-70); Continue PTA simvastatin 40 mg. May adjust pending LDL panel.   - Hgb A1c 5.9%, (within goal <7%)   - Encourage Mediterranean diet and daily exercise  - Continue to follow with your sleep medicine provider, discussed that untreated obstructive sleep apnea is an independent risk factor for  - Appreciate PT/OT/SLP consults  - Bedside Glucose Monitoring  - Euthermia, Euglycemia   - TIA/Stroke Education: Reviewed [facility] stroke warning signs with Kari    Diagnostic testing  - Continue telemetry while inpatient  - Lipid, Troponin X3  - 14 day cardiac event monitor (Zio Patch) to be mailed to patient, (ordred)     Patient Follow-up    - in the next 1-2 week(s) with PCP  - in 6-8 weeks with general neurology or stroke [facility] ([phone]) (ordered)    Thank you for this consult. We will follow peripherally for results of the LDL and if no concerns then will sign off. Please contact us with any additional questions.    Carmel Blandon PA-C  Vascular Neurology    To page me or covering  "stroke neurology team member, click here: AMCOM  Choose \"On Call\" tab at top, then select \"NEUROLOGY/ALL SITES\" from middle drop-down box, press Enter, then look for \"stroke\" or \"telestroke\" for your site.  _____________________________________________________    Clinically Significant Risk Factors Present on Admission                 # Drug Induced Platelet Defect: home medication list includes an antiplatelet medication             # Obesity: Estimated body mass index is 39.14 kg/m  as calculated from the following:    Height as of this encounter: 1.626 m (5' 4\").    Weight as of this encounter: 103.4 kg (228 lb).           # CKD, Stage 3b (GFR 30-44): Will monitor and treat as appropriate  - last Cr =  1.29 mg/dL (Ref range: 0.51 - 0.95 mg/dL)  - last GFR = 44 mL/min/1.73m2 (Ref range: >60 mL/min/1.73m2)      Past Medical History    Past Medical History:   Diagnosis Date    High cholesterol     Ventricular tachycardia (H)      Medications   Home Meds  Prior to Admission medications    Medication Sig Start Date End Date Taking? Authorizing Provider   acetaminophen (TYLENOL) 325 MG tablet Take 2 tablets (650 mg) by mouth every 4 hours as needed 3/10/16  Yes Natan Pereira MD   aspirin 81 MG EC tablet Take 81 mg by mouth every evening.   Yes Unknown, Entered By History   DULoxetine (CYMBALTA) 60 MG capsule Take 60 mg by mouth daily    Yes Reported, Patient   famotidine (PEPCID) 20 MG tablet Take 20 mg by mouth daily.   Yes Unknown, Entered By History   levothyroxine (SYNTHROID/LEVOTHROID) 50 MCG tablet Take 50 mcg by mouth every evening.   Yes Unknown, Entered By History   PROPRANOLOL HCL ER PO Take 220 mg by mouth daily. 160 mg + 60 mg   Yes Unknown, Entered By History   simvastatin (ZOCOR) 40 MG tablet Take 40 mg by mouth At Bedtime.   Yes Unknown, Entered By History   tolterodine ER (DETROL LA) 4 MG 24 hr capsule Take 4 mg by mouth daily.   Yes Unknown, Entered By History   vitamin D3 (CHOLECALCIFEROL) 50 " mcg (2000 units) tablet Take 1 tablet by mouth daily   Yes Unknown, Entered By History       Scheduled Meds  Current Facility-Administered Medications   Medication Dose Route Frequency Provider Last Rate Last Admin    [START ON 6/13/2025] aspirin EC tablet 81 mg  81 mg Oral Daily Varsha Valdes PA-C        Or    [START ON 6/13/2025] aspirin (ASA) chewable tablet 81 mg  81 mg Oral or NG Tube Daily Varsha Valdes PA-C        [START ON 6/13/2025] clopidogrel (PLAVIX) tablet 75 mg  75 mg Oral or NG Tube Daily Varsha Valdes PA-C        [START ON 6/13/2025] DULoxetine (CYMBALTA) DR capsule 60 mg  60 mg Oral Daily Varsha Valdes PA-C        [START ON 6/13/2025] famotidine (PEPCID) tablet 20 mg  20 mg Oral Daily Varsha Valdes PA-C        levothyroxine (SYNTHROID/LEVOTHROID) tablet 50 mcg  50 mcg Oral QPM Varsha Valdes PA-C        [Held by provider] propranolol ER (INDERAL LA) 24 hr capsule 220 mg  220 mg Oral Daily Varsha Valdes PA-C        simvastatin (ZOCOR) tablet 40 mg  40 mg Oral At Bedtime Varsha Valdes PA-C        sodium chloride (PF) 0.9% PF flush 3 mL  3 mL Intracatheter Q8H MISAEL Varsha Valdes PA-C        [START ON 6/13/2025] tolterodine ER (DETROL LA) 24 hr capsule 4 mg  4 mg Oral Daily Varsha Valdes PA-C           Infusion Meds  Current Facility-Administered Medications   Medication Dose Route Frequency Provider Last Rate Last Admin       Allergies   Allergies   Allergen Reactions    Carbamazepine     Dilantin [Phenytoin]     Sulfa Antibiotics           PHYSICAL EXAMINATION   Temp:  [97.3  F (36.3  C)-97.9  F (36.6  C)] 97.9  F (36.6  C)  Pulse:  [58-76] 62  Resp:  [16-18] 16  BP: (130-142)/(58-90) 137/64  SpO2:  [98 %-100 %] 98 %    General Exam  General:  patient lying in bed without any acute distress    HEENT:  normocephalic/atraumatic  Pulmonary:  no respiratory distress    Neuro Exam  Mental Status:   alert, oriented age, month, and situation, follows commands, speech clear and fluent, naming and repetition normal  Cranial Nerves:  visual fields intact (tested by nurse), EOMI with normal smooth pursuit, facial sensation intact and symmetric (tested by nurse), facial movements symmetric, hearing not formally tested but intact to conversation, no dysarthria, shoulder shrug equal bilaterally, tongue protrusion midline  Motor:  no abnormal movements, able to move all limbs antigravity spontaneously with no signs of hemiparesis observed, no pronator drift, no lower extremity drift   Reflexes:  unable to test (telestroke)  Sensory:  light touch sensation intact and symmetric throughout upper and lower extremities (assessed by nurse), no extinction on double simultaneous stimulation (assessed by nurse)  Coordination:  normal finger-to-nose and heel-to-shin bilaterally without dysmetria  Station/Gait:  unable to test due to telestroke    Stroke Scales  NIHSS  1a. Level of Consciousness 0-->Alert, keenly responsive   1b. LOC Questions 0-->Answers both questions correctly   1c. LOC Commands 0-->Performs both tasks correctly   2.   Best Gaze 0-->Normal   3.   Visual 0-->No visual loss   4.   Facial Palsy 0-->Normal symmetrical movements   5a. Motor Arm, Left 0-->No drift, limb holds 90 (or 45) degrees for full 10 secs   5b. Motor Arm, Right 0-->No drift, limb holds 90 (or 45) degrees for full 10 secs   6a. Motor Leg, Left 0-->No drift, leg holds 30 degree position for full 5 secs   6b. Motor Leg, right 0-->No drift, leg holds 30 degree position for full 5 secs   7.   Limb Ataxia 0-->Absent   8.   Sensory 0-->Normal, no sensory loss   9.   Best Language 0-->No aphasia, normal   10. Dysarthria 0-->Normal   11. Extinction and Inattention  0-->No abnormality   Total 0 (06/12/25 1416)     Imaging  I personally reviewed all imaging; relevant findings per HPI.    Labs Data   CBC  Recent Labs   Lab 06/12/25  0824   WBC 4.7   RBC  "4.63   HGB 14.5   HCT 43.8        Basic Metabolic Panel   Recent Labs   Lab 06/12/25  0824 06/12/25  0818     --    POTASSIUM 4.3  --    CHLORIDE 107  --    CO2 24  --    BUN 21.2  --    CR 1.29*  --    * 140*   MERLY 9.6  --      Liver Panel  No results for input(s): \"PROTTOTAL\", \"ALBUMIN\", \"BILITOTAL\", \"ALKPHOS\", \"AST\", \"ALT\", \"BILIDIRECT\" in the last 168 hours.  INR    Recent Labs   Lab Test 06/12/25  0824 01/26/21  1310   INR 0.91 0.91           Stroke Consult Data Data   Telestroke Service Details  (for non-emergent stroke consult with tele)  Video start time 06/12/25   1414   Video end time 06/12/25   1508   Type of service telemedicine diagnostic assessment of acute neurological changes   Reason telemedicine is appropriate patient requires assessment with a specialist for diagnosis and treatment of neurological symptoms   Mode of transmission secure interactive audio and video communication per Susan   Originating site (patient location) [facility]    Distant site (provider location) [facility]       I have personally spent a total of 75 minutes providing care today, time spent in reviewing medical records and devising the plan as recorded above.   "

## 2025-06-12 NOTE — CONSULTS
"[facility]    Stroke Telephone Note    I was called by Dr. Andres Ramirez on 06/12/25 regarding patient Smita Vazquez. The patient is a 69 year old female with a past medical history of CKD, HLD, HTN, essential tremor, hypothyroidism, PE (2021), hx of stroke (per chart review ,it was found incidentally on MRI brain) and seizure history presenting to [facility] ED with right facial numbness/paresthesias, right upper extremity weakness and numbness/paresthesias.  Last known normal was bedtime yesterday (6/11) at 10:30 PM. She then awoke up this morning at 0700 with symptoms.  On initial ED provider exam, decreased right upper extremity  strength, decreased right face and right upper extremity light touch sensation, no pronator drift. PTA aspirin 325 mg and simvastatin 40 mg.    Vitals  BP: (!) 130/90   Pulse: 76   Resp: 18   Temp: 97.3  F (36.3  C)   Weight: 104.3 kg (229 lb 15 oz)    Stroke Code Data (for stroke code without tele)  Stroke code activated 06/12/25  0819   Stroke provider first response 06/12/25  0825   Last known normal 06/11/25  2230      Time of discovery (or onset of symptoms) 06/12/25  0700   Head CT read by Stroke Neuro Provider 06/12/25  0835   Was stroke code de-escalated? Yes  06/12/25 2051     Imaging Findings  CT head: No acute intracranial hemorrhage  CTA head/neck: No significant stenosis or LVO  CT Perfusion: No Perfusion deficit.     Intravenous Thrombolysis  Not given due to:   - unclear or unfavorable risk-benefit profile for extended window thrombolysis beyond the conventional 4.5 hour time window    Endovascular Treatment  Not initiated due to absence of proximal vessel occlusion    Impression  Right facial numbness/paresthesias, right upper extremity weakness and numbness/paresthesia; unclear etiology.  Concern for left hemispheric infarct.  Will admit for further stroke workup.    Recommendations   - Use orderset: \"Ischemic Stroke Routine Admission\" " "or \"Ischemic Stroke No Thrombolytics/No Thrombectomy ICU Admission\"  - Place Neurology IP Stroke Consult order   - Neurochecks and Vital Signs every 4 hours   - Permissive HTN; goal SBP < 220 mmHg  - Daily aspirin 300 mg suppository x 1 dose  - Plavix (clopidogrel) 300 mg PO loading dose x 1; only to be given after passes SLP evaluation given potential facial droop.  - Tomorrow, anticipate continuation of aspirin 81 mg and Plavix (clopidogrel) 75 mg, duration dependent on workup  - Statin: pending LDL, can continue PTA simvastatin once passed SLP evaluation   - MRI Brain with and without contrast   - TTE (with Bubble Study if age 60 yrs or less)  - Telemetry, EKG  - Bedside Glucose Monitoring  - A1c, Lipid Panel, Troponin x 3  - PT/OT/SLP  - Nutrition: If evidence of facial droop, please keep n.p.o. until SLP evaluation  - Stroke Education  - Euthermia, Euglycemia    Case discussed with vascular neurology attending Dr. Foster.    My recommendations are based on the information provided over the phone by Smita Vazquez's in-person providers. They are not intended to replace the clinical judgment of her in-person providers. I was not requested to personally see or examine the patient at this time.     Beatrice Hill PA-C  Vascular Neurology    To page me or covering stroke neurology team member, click here: AMCOM  Choose \"On Call\" tab at top, then select \"NEUROLOGY/ALL SITES\" from middle drop-down box, press Enter, then look for \"stroke\" or \"telestroke\" for your site.   "

## 2025-06-12 NOTE — PLAN OF CARE
Vitals are Temp: 97.9  F (36.6  C) Temp src: Oral BP: 137/64 Pulse: 62   Resp: 16 SpO2: 98 %.    Alert and orient x4. VSS on RA. Denies pain, N/V, and SOB. PIV SL. Up ad jose m. Tele SR 60's. Neuro checks q 4 hrs are intact.     PRIMARY DIAGNOSIS: Right arm weakness, TIA  OUTPATIENT/OBSERVATION GOALS TO BE MET BEFORE DISCHARGE:  ADLs back to baseline: Yes    Activity and level of assistance: Ambulating independently.    Pain status: Pain free.    Return to near baseline physical activity: Yes     Discharge Planner Nurse   Safe discharge environment identified: Yes  Barriers to discharge: Yes       Entered by: Kassie Marquez RN 06/12/2025      Please review provider order for any additional goals.   Nurse to notify provider when observation goals have been met and patient is ready for discharge.      Goal Outcome Evaluation:      Plan of Care Reviewed With: patient    Overall Patient Progress: improvingOverall Patient Progress: improving    Outcome Evaluation: AOx4, VSS, RA, denies chest pain, n/v, and sob. Tele SR 60's. Neuro check intact.

## 2025-06-12 NOTE — ED NOTES
"[facility]  ED Nurse Handoff Report    ED Chief complaint: Extremity Weakness  . ED Diagnosis:   Final diagnoses:   Right arm weakness   Arm paresthesia, right   Facial paresthesia   Slurred speech   Acute CVA (cerebrovascular accident) (H)       Allergies:   Allergies   Allergen Reactions    Carbamazepine     Dilantin [Phenytoin]     Sulfa Antibiotics        Code Status: Full Code    Activity level - Baseline/Home:  independent.  Activity Level - Current:   standby.   Lift room needed: No.   Bariatric: No   Needed: No   Isolation: No.   Infection: Not Applicable.     Respiratory status: Room air    Vital Signs (within 30 minutes):   Vitals:    06/12/25 0816   BP: (!) 130/90   Pulse: 76   Resp: 18   Temp: 97.3  F (36.3  C)   TempSrc: Temporal   SpO2: 100%   Weight: 104.3 kg (229 lb 15 oz)   Height: 1.626 m (5' 4\")       Cardiac Rhythm:  ,      Pain level:    Patient confused: No.   Patient Falls Risk: patient and family education.   Elimination Status: Has voided     Patient Report - Initial Complaint: Extremity weakness, stroke symptoms.   Focused Assessment:   0940  Neuro Cognitive  FH    Sayra Coma ScaleBest Eye Response: 4-->(E4) spontaneousBest Motor Response: 6-->(M6) obeys commandsBest Verbal Response: 5-->(V5) orientedGlasgow Coma Scale Score: 15  Extraocular MovementLeft Extraocular Movement: conjugateRight Extraocular Movement: conjugate  CAM (Confusion Assessment Method)(3) Disorganized Thinking: no(1) Acute Onset/Fluctuating Course: no(2) Inattention: no(4) Altered Level of Consciousness: noDelirium present?: no  Neuro CognitiveCognitive/Neuro/Behavioral WDL: .WDL except (pt reporting right arm and hand weakness that started around 0700 today after waking up. she states that she felt as though she could not move her arm properly enough to complete any ADL's. these symptoms are currently resolved.)Mood/Behavior: calm; cooperativeSpeech: clear; spontaneous; " logicalAdditional Documentation: Stroke Assessment (non NIHSS)Orientation: oriented x 4Last Known Well Date: 25Last Known Well Time: 0700Level of Consciousness: alertArousal Level: opens eyes spontaneously  Pupils (CN II)Pupil Size Left: 4 mmPupil Reaction Right: equalPupil Reaction Left: equalPupil Accommodation Left: normal responsePupil Accommodation Right: normal responsePupil Shape Left: roundPupil PERRLA: yesPupil Shape Right: roundPupil Size Right: 4 mm  Stroke Exam ContinuedLUE Muscle Strength Gradin - normal muscle strengthRUE Muscle Strength Gradin - normal muscle strengthLLE Muscle Strength Gradin - normal muscle strengthRLE Muscle Strength Gradin - normal muscle strengthFace Symptoms: symmetrical at rest, with movement and expressionFinger-to-nose: Slow, deliberateHeel-to-shin -left: Smooth, accurateHeel-to-shin -right: Smooth, accurate  Motor StrengthLeft Upper Motor Strength: 5 - active movement against gravity and full resistanceRight Upper Motor Strength: 5 - active movement against gravity and full resistanceLeft Lower Motor Strength: 5 - active movement against gravity and full resistanceRight Lower Motor Strength: 5 - active movement against gravity and full resistance          Abnormal Results:   Labs Ordered and Resulted from Time of ED Arrival to Time of ED Departure   BASIC METABOLIC PANEL - Abnormal       Result Value    Sodium 143      Potassium 4.3      Chloride 107      Carbon Dioxide (CO2) 24      Anion Gap 12      Urea Nitrogen 21.2      Creatinine 1.29 (*)     GFR Estimate 44 (*)     Calcium 9.6      Glucose 108 (*)    GLUCOSE BY METER - Abnormal    GLUCOSE BY METER POCT 140 (*)    INR - Normal    INR 0.91      PT 12.4     PARTIAL THROMBOPLASTIN TIME - Normal    aPTT 26     TROPONIN T, HIGH SENSITIVITY - Normal    Troponin T, High Sensitivity 9     GLUCOSE MONITOR NURSING POCT   CBC WITH PLATELETS AND DIFFERENTIAL    WBC Count 4.7      RBC Count 4.63       Hemoglobin 14.5      Hematocrit 43.8      MCV 95      MCH 31.3      MCHC 33.1      RDW 12.1      Platelet Count 210      % Neutrophils 57      % Lymphocytes 29      % Monocytes 8      % Eosinophils 5      % Basophils 1      % Immature Granulocytes 0      NRBCs per 100 WBC 0      Absolute Neutrophils 2.7      Absolute Lymphocytes 1.4      Absolute Monocytes 0.4      Absolute Eosinophils 0.2      Absolute Basophils 0.1      Absolute Immature Granulocytes 0.0      Absolute NRBCs 0.0     TROPONIN T, HIGH SENSITIVITY        CT Head Perfusion w Contrast - For Tier 2 Stroke   Final Result   IMPRESSION:    1.  Normal cerebral perfusion.      CTA Head Neck with Contrast   Final Result   CONCLUSION:   HEAD CTA:   1.  Mild intracranial atherosclerosis.   2.  No vessel stenosis, occlusion or aneurysm.      NECK CTA:   1.  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   2.  No evidence for acute dissection or hemodynamically significant narrowing in the neck by NASCET criteria.      Findings were discussed with Dr. AB COVARRUBIAS via telephone at 853 hours CDT on 6/12/2025.         CT Head w/o Contrast   Final Result   IMPRESSION:   No acute intracranial injury, hemorrhage, mass, or CT evidence of recent ischemia.             Treatments provided: See MAR  Family Comments:  intermittently at bedside  OBS brochure/video discussed/provided to patient:  N/A  ED Medications:   Medications   iopamidol (ISOVUE-370) solution 117 mL (117 mLs Intravenous $Given 6/12/25 0829)     And   sodium chloride 0.9 % bag for CT scan flush (95 mLs As instructed $Given 6/12/25 0829)   aspirin (ASA) tablet 325 mg (325 mg Oral $Given 6/12/25 1028)   clopidogrel (PLAVIX) tablet 300 mg (300 mg Oral $Given 6/12/25 1028)       Drips infusing:  No  For the majority of the shift this patient was Green.   Interventions  performed were N/A.    Sepsis treatment initiated: No    Cares/treatment/interventions/medications to be completed following ED care: N/A    ED Nurse Name: Everton Hdez RN  10:38 AM

## 2025-06-12 NOTE — ED TRIAGE NOTES
Pt here with c/o R arm weakness since approx 0700. Pt states she was trying to  a cereal box and brush teeth with difficulty. Denies HA, visual changes, lightheadedness/dizziness. Takes 81 mg ASA following PE with COVID. No facial droop or slurred speech. R hand  weaker. No arm drift. Steady gait. A&Ox4.

## 2025-06-12 NOTE — ED PROVIDER NOTES
"  Emergency Department Note      History of Present Illness     Chief Complaint   Extremity Weakness      HPI   Simta Vazquez is a 69 year old female with history of hyperlipidemia, hypertension, and a PE who presents to the ED with her  for evaluation of extremity weakness. The patient reports that last night she went to bed feeling fine. This morning, she woke up at 0700 and felt weakness in her right arm. Patient had difficulty brushing her teeth and grabbing her cereal box. She endorses right-sided facial paresthesia. Patient notes that she has had 3 separate instances of sharp chest tightness in the past 2 weeks. Denies any current chest pain. Denies headache, vision changes, confusion, or slurred speech.     Independent Historian   None    Review of External Notes   I reviewed her recent E consult for resting tremors    Past Medical History     Medical History and Problem List   Hyperlipidemia  Ventricular tachycardia  PE  GERD  Hypothyroidism  MDD  Hypertension  Anxiety  Stage 3a CKD  Fatty liver  Essential tremor    Medications   Aspirin 81 mg  Cymbalta  Oxybutynin  Propranolol  Simvastatin  Levothyroxine  Pepcid    Surgical History   Hysterectomy  Nephrectomy  Bladder repair  Tonsillectomy    Physical Exam     Patient Vitals for the past 24 hrs:   BP Temp Temp src Pulse Resp SpO2 Height Weight   06/12/25 1252 135/59 97.8  F (36.6  C) Oral 68 16 100 % -- --   06/12/25 1056 (!) 142/58 97.8  F (36.6  C) Oral 58 18 -- -- --   06/12/25 0816 (!) 130/90 97.3  F (36.3  C) Temporal 76 18 100 % 1.626 m (5' 4\") 104.3 kg (229 lb 15 oz)     Physical Exam  Constitutional: Vital signs reviewed as above  General: Alert, pleasant  HEENT: Moist mucous membranes  Eyes: Pupils are equal, round, and reactive to light.   Neck: Normal range of motion  Cardiovascular: normal rate, Regular rhythm and normal heart sounds.  Mo MRG  Pulmonary/Chest: Effort normal and breath sounds normal. No respiratory distress. Patient has " no wheezes. Patient has no rales.   Gastrointestinal: Soft. Positive bowel sounds. No MRG.  Musculoskeletal/Extremities: Full ROM.  Endo: No pitting edema  Neurological: A/O x 3.  Alert speech, right cheek paresthesias otherwise CN-II-XII intact bilaterally. No pronator drift.  4.5 out of 5 strength in the right upper extremity with diminished light touch sensation..  Normal strength and sensation throughout the other 3 extremities.   Skin: Skin is warm and dry.   Psychiatric: Pleasant     Diagnostics     Lab Results   Labs Ordered and Resulted from Time of ED Arrival to Time of ED Departure   BASIC METABOLIC PANEL - Abnormal       Result Value    Sodium 143      Potassium 4.3      Chloride 107      Carbon Dioxide (CO2) 24      Anion Gap 12      Urea Nitrogen 21.2      Creatinine 1.29 (*)     GFR Estimate 44 (*)     Calcium 9.6      Glucose 108 (*)    GLUCOSE BY METER - Abnormal    GLUCOSE BY METER POCT 140 (*)    INR - Normal    INR 0.91      PT 12.4     PARTIAL THROMBOPLASTIN TIME - Normal    aPTT 26     TROPONIN T, HIGH SENSITIVITY - Normal    Troponin T, High Sensitivity 9     TROPONIN T, HIGH SENSITIVITY - Normal    Troponin T, High Sensitivity 8     GLUCOSE MONITOR NURSING POCT   CBC WITH PLATELETS AND DIFFERENTIAL    WBC Count 4.7      RBC Count 4.63      Hemoglobin 14.5      Hematocrit 43.8      MCV 95      MCH 31.3      MCHC 33.1      RDW 12.1      Platelet Count 210      % Neutrophils 57      % Lymphocytes 29      % Monocytes 8      % Eosinophils 5      % Basophils 1      % Immature Granulocytes 0      NRBCs per 100 WBC 0      Absolute Neutrophils 2.7      Absolute Lymphocytes 1.4      Absolute Monocytes 0.4      Absolute Eosinophils 0.2      Absolute Basophils 0.1      Absolute Immature Granulocytes 0.0      Absolute NRBCs 0.0     GLUCOSE MONITOR NURSING POCT   GLUCOSE MONITOR NURSING POCT   GLUCOSE MONITOR NURSING POCT   HEMOGLOBIN A1C   LIPID PROFILE       Imaging   MR Brain w/o & w Contrast   Final  Result   IMPRESSION:   1.  No acute intracranial process.   2.  Generalized brain atrophy and presumed microvascular ischemic changes as detailed above.   3.  Chronic lacunar type infarctions and/or perivascular spaces in the basal ganglia and thalami and left cerebral hemisphere.   4.  No acute or chronic hemorrhage. No intracranial mass or mass effect. No restricted diffusion abnormality/recent infarction is evident.   5.  No pathologic enhancement intracranially.      CT Head Perfusion w Contrast - For Tier 2 Stroke   Final Result   IMPRESSION:    1.  Normal cerebral perfusion.      CTA Head Neck with Contrast   Final Result   CONCLUSION:   HEAD CTA:   1.  Mild intracranial atherosclerosis.   2.  No vessel stenosis, occlusion or aneurysm.      NECK CTA:   1.  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   2.  No evidence for acute dissection or hemodynamically significant narrowing in the neck by NASCET criteria.      Findings were discussed with Dr. AB COVARRUBIAS via telephone at 853 hours CDT on 6/12/2025.         CT Head w/o Contrast   Final Result   IMPRESSION:   No acute intracranial injury, hemorrhage, mass, or CT evidence of recent ischemia.         Echocardiogram Complete - For age > 60 yrs    (Results Pending)       EKG   ECG taken at 08:48, ECG read at 09:00  Normal sinus rhythm   Rate 70 bpm. CT interval 154 ms. QRS duration 80 ms. QT/QTc 402/434 ms. P-R-T axes 53 -16 31.    Independent Interpretation   CT Head: No intracranial hemorrhage or midline shift.    ED Course      Medications Administered   Medications   DULoxetine (CYMBALTA) DR capsule 60 mg (has no administration in time range)   famotidine (PEPCID) tablet 20 mg (has no administration in time range)   levothyroxine (SYNTHROID/LEVOTHROID) tablet 50 mcg (has no administration in time range)   propranolol ER  (INDERAL LA) 24 hr capsule 220 mg ( Oral Automatically Held 6/16/25 0800)   simvastatin (ZOCOR) tablet 40 mg (has no administration in time range)   tolterodine ER (DETROL LA) 24 hr capsule 4 mg (has no administration in time range)   lidocaine 1 % 0.1-1 mL (has no administration in time range)   lidocaine (LMX4) cream (has no administration in time range)   sodium chloride (PF) 0.9% PF flush 3 mL (has no administration in time range)   sodium chloride (PF) 0.9% PF flush 3 mL (has no administration in time range)   aspirin EC tablet 81 mg (has no administration in time range)     Or   aspirin (ASA) chewable tablet 81 mg (has no administration in time range)   clopidogrel (PLAVIX) tablet 75 mg (has no administration in time range)   ondansetron (ZOFRAN ODT) ODT tab 4 mg (has no administration in time range)     Or   ondansetron (ZOFRAN) injection 4 mg (has no administration in time range)   acetaminophen (TYLENOL) tablet 650 mg (has no administration in time range)     Or   acetaminophen (TYLENOL) oral liquid 650 mg (has no administration in time range)     Or   acetaminophen (TYLENOL) Suppository 650 mg (has no administration in time range)   iopamidol (ISOVUE-370) solution 117 mL (117 mLs Intravenous $Given 6/12/25 0829)     And   sodium chloride 0.9 % bag for CT scan flush (95 mLs As instructed $Given 6/12/25 0829)   aspirin (ASA) tablet 325 mg (325 mg Oral $Given 6/12/25 1028)   clopidogrel (PLAVIX) tablet 300 mg (300 mg Oral $Given 6/12/25 1028)   gadobutrol (GADAVIST) injection 10.5 mL (10.5 mLs Intravenous $Given 6/12/25 1220)       Procedures   None     Discussion of Management   See ED Course Below    ED Course   ED Course as of 06/12/25 1335   Thu Jun 12, 2025   0818 I obtained history and examined the patient as noted above.    0828 I spoke with stroke neuro   0854 I consulted with stroke neuro again. They recommended admission.   0937 I spoke with the PARodríguez, for the admitting hospitalist, Dr. Felton.  Patient is accepted for admission.       Additional Documentation  None    Medical Decision Making / Diagnosis     CMS Diagnoses: The patient has stroke symptoms:         ED Stroke specific documentation           NIHSS PDF     Patient last known well time: 1030 last night  ED Provider first to bedside at: 0816  CT Results received at: 0853    Thrombolytics:   Not given due to:   - unclear or unfavorable risk-benefit profile for extended window thrombolysis beyond the conventional 4.5 hour time window    If treating with thrombolytics: Ensure SBP<180 and DBP<105 prior to treatment with thrombolytics.  Administering thrombolytics after treatment with IV labetalol, hydralazine, or nicardipine is reasonable once BP control is established.    Endovascular Retrieval:  Not initiated due to absence of proximal vessel occlusion    [employer] Stroke Scale (Baseline)  Time Performed: 0818     Score    Level of consciousness: (0)   Alert, keenly responsive    LOC questions: (0)   Answers both questions correctly    LOC commands: (0)   Performs both tasks correctly    Best gaze: (0)   Normal    Visual: (0)   No visual loss    Facial palsy: (0)   Normal symmetrical movements    Motor arm (left): (0)   No drift    Motor arm (right): (0)   No drift    Motor leg (left): (0)   No drift    Motor leg (right): (0)   No drift    Limb ataxia: (0)   Absent    Sensory: (1)   Mild to moderate sensory loss    Best language: (0)   Normal- no aphasia    Dysarthria: (1)   Mild to moderate dysarthria    Extinction and inattention: (0)   No abnormality        Total Score:  2       MIPS   None    MDM   Smita Vazquez is a 69 year old female who presents to the emergency department with concerns over strokelike symptoms.  Again last well-known was 1030 last night and she woke up at 7 with the symptoms.  As such a tier 2 stroke was called.  CT CTA and CT perfusion not show anything acute.  Symptoms remained.  She was seen by the  stroke neurology team.  They recommend admission and MRI and loading with aspirin and Plavix.  She did pass her swallow study and was given the aspirin and Plavix.  EKG here was nondiagnostic and the rest of her labs were essentially unremarkable.  She be brought into the hospital for further workup.    Disposition   The patient was admitted to the hospital.     Diagnosis     ICD-10-CM    1. Right arm weakness  R29.898       2. Arm paresthesia, right  R20.2       3. Facial paresthesia  R20.2       4. Slurred speech  R47.81       5. Acute CVA (cerebrovascular accident) (H)  I63.9            Scribe Disclosure:  Rudy DUKE, am serving as a scribe at 8:35 AM on 6/12/2025 to document services personally performed by Andres Ramirez MD based on my observations and the provider's statements to me.        Andres Ramirez MD  06/12/25 [id number]

## 2025-06-12 NOTE — PLAN OF CARE
ROOM # 202-1    Living Situation (if not independent, order SW consult):  Facility name:  : [facility],     Activity level at baseline: Independent  Activity level on admit: Stand by assist    Who will be transporting you at discharge: family    Patient registered to observation; given Patient Bill of Rights; given the opportunity to ask questions about observation status and their plan of care.  Patient has been oriented to the observation room, bathroom and call light is in place.    Discussed discharge goals and expectations with patient/family.

## 2025-06-12 NOTE — H&P
[facility]    History and Physical - Hospitalist Service       Date of Admission:  6/12/2025    Provider attestation: I agree with Ken Heredia's hospital admission note and physical exam.  Upon waking this morning patient had right arm weakness along with right facial paresthesias that have now almost completely resolved.  This is never happened before.  She reports no slurred speech, facial droop or problems walking.  Imaging so far is negative but we have yet to get MRI.  Stroke neurology has placed initial recommendations and will see patient in the morning.  Varsha Valdes PA-C    Assessment & Plan   Smita Vazquez is a 69 year old female who has a past medical history of CKD, HLD, HTN, essential tremor, hyperthyroidism, kidney donation and pulmonary embolism  and incidental stroke on MRI from 4/18/2019., who presented to the ED by private vehicle with complaint of right sided facial numbness, Right upper extremity weakness and numbness.       On arrival to the ED stroke code was activated.  Patient was afebrile, heart rate 76, pressure 130/90 and breathing comfortably on room air without hypoxia.  CT head negative, CT head perfusion negative, and CTA head/neck negative.  Lab work shows creatinine 1.29 which close to baseline, normal electrolytes, glucose elevated at 108, high-sensitivity troponin of 9 with 2-hour repeat of 8 and normal CBC.  EKG shows sinus rhythm.  Neurology was called and code stroke was downgraded. Patient was started on Plavix and ASA and MRI ordered.  Symptoms have almost completely resolved so she was admitted as observation for further stroke work-up.     # Right arm weakness/paraesthesias and right facial paraesthesias. Further investigate for TIA/Stroke  -Patient awoke with right arm weakness and paresthesias affecting her right arm.  She noted this while she was trying to pour a box of cereal.  There was no leg weakness, slurring of speech or facial  "drooping that she noted.  Symptoms were not present when she went to bed last night.  -At the time of my exam symptoms have almost completely resolved  -Workup as above was negative for acute stroke  -Stroke neurology consulted  - Neurochecks and Vital Signs every 4 hours   - Permissive HTN; goal SBP < 220 mmHg  - Daily aspirin 300 mg suppository x 1 dose  - Plavix (clopidogrel) 300 mg PO loading dose x 1; only to be given after passes SLP evaluation given potential facial droop.  - 6/13 anticipate continuation of aspirin 81 mg and Plavix (clopidogrel) 75 mg, duration dependent on workup  - Statin: pending LDL, can continue PTA simvastatin once passed SLP evaluation   - MRI Brain with and without contrast   - TTE (with Bubble Study if age 60 yrs or less)  - Telemetry  - Bedside Glucose Monitoring  - A1c, Lipid Panel, Troponin x 3  - Given that she is back to baseline will defer PT and OT    # History of CKD  -Baseline creatinine 1.4 with admission creatinine 1.29  -She has chronically low GFR and high creatinine most likely due to her kidney donation  - Avoid nephrotoxins    # Hypothyroidism  -Continue levothyroxine    #Essential tremor  - Hold propranolol given permissive HTN    #Overactive bladder  -Tolterodine continued    #HLP  -Continue Simvastatin      Diet: NPO for Medical/Clinical Reasons Except for: No Exceptions    DVT Prophylaxis: Ambulate every shift  Garcia Catheter: Not present  Lines: None     Cardiac Monitoring: None  Code Status: FULL from previous docs.     Clinically Significant Risk Factors Present on Admission                 # Drug Induced Platelet Defect: home medication list includes an antiplatelet medication             # Obesity: Estimated body mass index is 39.47 kg/m  as calculated from the following:    Height as of this encounter: 1.626 m (5' 4\").    Weight as of this encounter: 104.3 kg (229 lb 15 oz).              Disposition Plan     Medically Ready for Discharge: Anticipated " Tomorrow         The patient's care was discussed with the Patient and ED Consultant(s).    Ken ABBOTT-Student  Hospitalist Service  [facility]  Securely message with Dinesh (more info)  Text page via [facility] Paging/Directory     ______________________________________________________________________    Chief Complaint   Right arm weakness and right facial numbness    History is obtained from the patient.    History of Present Illness   Smita Vazquez is a 69 year old female who has a past medical history of CKD, HLD, HTN, essential tremor, hyperthyroidism, kidney donation and pulmonary embolism  and incidental stroke on MRI  who presented to the ED by private vehicle with complaint of right sided facial numbness, Right upper extremity weakness and numbness. She relays she went to bed around 1030 pm last night and woke up at 7am on 6/12 with decreased strength and coordination in her right hand and arm while attempting to pour cereal into a bowl and was unable to grasp the spoon in her right hand. She also relays right sided facial numbness/ paraesthesias. She denied any headache or difficulty speaking, visual changes or disturbances, or trouble walking.    She denies any LOC, Head ache          Past Medical History    Past Medical History:   Diagnosis Date    High cholesterol     Ventricular tachycardia (H)        Past Surgical History   Past Surgical History:   Procedure Laterality Date    ABDOMEN SURGERY      kidney donor for son    GYN SURGERY      hysterectomy       Prior to Admission Medications   Prior to Admission Medications   Prescriptions Last Dose Informant Patient Reported? Taking?   DULoxetine (CYMBALTA) 60 MG capsule   Yes No   Sig: Take 60 mg by mouth daily    SUMAtriptan (IMITREX) 25 MG tablet   Yes No   Sig: Take 25 mg by mouth at onset of headache for migraine   acetaminophen (TYLENOL) 325 MG tablet   No No   Sig: Take 2 tablets (650 mg) by mouth every 4 hours as  needed   aspirin 325 MG tablet   Yes No   Sig: Take 325 mg by mouth every evening    oxybutynin (DITROPAN-XL) 10 MG 24 hr tablet   Yes No   Sig: Take 10 mg by mouth daily    propranolol ER (INDERAL LA) 160 MG 24 hr capsule   Yes No   Sig: Take 160 mg by mouth daily   simvastatin (ZOCOR) 40 MG tablet   Yes No   Sig: Take 40 mg by mouth At Bedtime.   vitamin D3 (CHOLECALCIFEROL) 50 mcg (2000 units) tablet   Yes No   Sig: Take 1 tablet by mouth daily      Facility-Administered Medications: None        Review of Systems    The 10 point Review of Systems is negative other than noted in the HPI or here. She does note occasional pain in epigastric region of abdomen about once a week.    Physical Exam   Vital Signs: Temp: 97.3  F (36.3  C) Temp src: Temporal BP: (!) 130/90 Pulse: 76   Resp: 18 SpO2: 100 % O2 Device: None (Room air)    Weight: 229 lbs 15.04 oz    Constitutional: awake, alert, cooperative, no apparent distress, and appears stated age  Eyes: Lids and lashes normal, pupils equal, round and reactive to light, extra ocular muscles intact, sclera clear, conjunctiva normal  Respiratory: No increased work of breathing, good air exchange, clear to auscultation bilaterally, no crackles or wheezing  Cardiovascular: Normal apical impulse, regular rate and rhythm, normal S1 and S2, no S3 or S4, and no murmur noted  GI: No scars, normal bowel sounds, soft, non-distended, non-tender, no masses palpated, no hepatosplenomegally  Musculoskeletal: There is no redness, warmth, or swelling of the joints.  Full range of motion noted.  Motor strength is 5 out of 5 all extremities bilaterally.  Tone is normal.  Neurologic: Awake, alert, oriented to name, place and time.  Cranial nerves II-XII are grossly intact.  Motor is 5 out of 5 bilaterally on lower extremities but 4/5 upper, with noted weakness on right   strength and push/pull as compared to left. Slight R pronator drift appreciated.  Sensory is intact.  Babinski down  going.    Medical Decision Making       55 MINUTES SPENT BY ME on the date of service doing chart review, history, exam, documentation & further activities per the note.      Data     I have personally reviewed the following data over the past 24 hrs:    4.7  \   14.5   / 210     143 107 21.2 /  108 (H)   4.3 24 1.29 (H) \     Trop: 8 BNP: N/A     INR:  0.91 PTT:  26   D-dimer:  N/A Fibrinogen:  N/A       Imaging results reviewed over the past 24 hrs:   Recent Results (from the past 24 hours)   CT Head w/o Contrast    Narrative    EXAM: CT HEAD W/O CONTRAST  LOCATION: [facility]  DATE: 6/12/2025    INDICATION: Code Stroke, rule out hemorrhage and evaluate for potential thrombolysis thrombectomy. PLEASE READ IMMEDIATELY. Right arm weakness  COMPARISON: None.  TECHNIQUE: Routine CT Head without IV contrast. Multiplanar reformats. Dose reduction techniques were used.    FINDINGS:  INTRACRANIAL CONTENTS: No intracranial hemorrhage, extraaxial collection, or mass effect.  No CT evidence of acute infarct. Mild presumed chronic small vessel ischemic changes. Normal ventricles and sulci.     VISUALIZED ORBITS/SINUSES/MASTOIDS: No intraorbital abnormality. No paranasal sinus mucosal disease. No middle ear or mastoid effusion.    BONES/SOFT TISSUES: No acute abnormality.      Impression    IMPRESSION:  No acute intracranial injury, hemorrhage, mass, or CT evidence of recent ischemia.     CTA Head Neck with Contrast    Narrative    HEAD AND NECK CT ANGIOGRAM WITH IV CONTRAST  [facility]  6/12/2025 8:34 AM CDT    INDICATION: Code Stroke, evaluate for LVO. PLEASE READ IMMEDIATELY. Right arm weakness  TECHNIQUE: Head and neck CT angiogram with IV contrast. CT images of the head and neck vessels obtained during the arterial phase of intravenous contrast administration. Axial helical 2D reconstructed images and multiplanar 3D MIP reconstructed images of   the head and neck vessels were  performed by the technologist. Dose reduction techniques were used. Vessel stenoses reported according to NASCET criteria.  CONTRAST: 67mL Isovue 370  COMPARISON: None.     FINDINGS:  HEAD CTA: Mild intracranial atherosclerosis predominantly affecting the carotid siphons. No large vessel occlusion, aneurysm or arteriovenous malformation. The distal dural venous sinuses are not well visualized due to the arterial timing of the contrast   bolus.    NECK CTA: Three vessel arch.  Carotid arteries are patent without atherosclerotic change.  No hemodynamically significant stenosis by NASCET criteria in either carotid system.  There is a 3 mm linear outpouching along the superior margin of the distal   cervical left vertebral artery at C1 level (image 285 series 6) suspicious for a small pseudoaneurysm from prior vessel injury. No evidence for acute dissection or stenosis within either vertebral artery.      Impression    CONCLUSION:  HEAD CTA:  1.  Mild intracranial atherosclerosis.  2.  No vessel stenosis, occlusion or aneurysm.    NECK CTA:  1.  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  2.  No evidence for acute dissection or hemodynamically significant narrowing in the neck by NASCET criteria.    Findings were discussed with Dr. AB COVARRUBIAS via telephone at 853 hours CDT on 6/12/2025.     CT Head Perfusion w Contrast - For Tier 2 Stroke    Narrative    EXAM: CT HEAD PERFUSION W CONTRAST  LOCATION: [facility]  DATE/TIME: 6/12/2025 8:45 AM CDT    INDICATION: Code Stroke to evaluate for potential thrombolysis and thrombectomy. Evaluate mismatch between penumbra and core infarct. READ IMMEDIATELY.  TECHNIQUE: CT cerebral perfusion was performed utilizing a contrast bolus. Perfusion data were post processed with generation of standard perfusion maps and estimation  of ischemic/infarcted volumes utilizing standard threshold values. Dose reduction   techniques were used.   CONTRAST: 50mL Isovue 370  COMPARISON: None.    FINDINGS:   PERFUSION MAPS: Symmetrical cerebral perfusion. No focal deficits in cerebral blood flow or volume to suggest ischemia/oligemia.    RAPID ANALYSIS:  CBF<30%: 0 mL  Tmax>6sec: 0 mL  Mismatch volume: 0 mL  Mismatch ratio: None      Impression    IMPRESSION:   1.  Normal cerebral perfusion.

## 2025-06-13 ENCOUNTER — ORDERS ONLY (AUTO-RELEASED) (OUTPATIENT)
Dept: MEDSURG UNIT | Facility: CLINIC | Age: 70
End: 2025-06-13

## 2025-06-13 VITALS
RESPIRATION RATE: 16 BRPM | BODY MASS INDEX: 38.93 KG/M2 | OXYGEN SATURATION: 96 % | HEART RATE: 66 BPM | HEIGHT: 64 IN | SYSTOLIC BLOOD PRESSURE: 108 MMHG | DIASTOLIC BLOOD PRESSURE: 70 MMHG | TEMPERATURE: 97.5 F | WEIGHT: 228 LBS

## 2025-06-13 DIAGNOSIS — G45.9 TIA (TRANSIENT ISCHEMIC ATTACK): ICD-10-CM

## 2025-06-13 LAB
ANION GAP SERPL CALCULATED.3IONS-SCNC: 12 MMOL/L (ref 7–15)
BUN SERPL-MCNC: 19 MG/DL (ref 8–23)
CALCIUM SERPL-MCNC: 9.4 MG/DL (ref 8.8–10.4)
CHLORIDE SERPL-SCNC: 105 MMOL/L (ref 98–107)
CREAT SERPL-MCNC: 1.44 MG/DL (ref 0.51–0.95)
EGFRCR SERPLBLD CKD-EPI 2021: 39 ML/MIN/1.73M2
ERYTHROCYTE [DISTWIDTH] IN BLOOD BY AUTOMATED COUNT: 12 % (ref 10–15)
GLUCOSE SERPL-MCNC: 105 MG/DL (ref 70–99)
HCO3 SERPL-SCNC: 25 MMOL/L (ref 22–29)
HCT VFR BLD AUTO: 40.6 % (ref 35–47)
HGB BLD-MCNC: 13.6 G/DL (ref 11.7–15.7)
MCH RBC QN AUTO: 31.6 PG (ref 26.5–33)
MCHC RBC AUTO-ENTMCNC: 33.5 G/DL (ref 31.5–36.5)
MCV RBC AUTO: 94 FL (ref 78–100)
PLATELET # BLD AUTO: 170 10E3/UL (ref 150–450)
POTASSIUM SERPL-SCNC: 4.5 MMOL/L (ref 3.4–5.3)
RBC # BLD AUTO: 4.31 10E6/UL (ref 3.8–5.2)
SODIUM SERPL-SCNC: 142 MMOL/L (ref 135–145)
WBC # BLD AUTO: 6 10E3/UL (ref 4–11)

## 2025-06-13 PROCEDURE — 250N000013 HC RX MED GY IP 250 OP 250 PS 637: Performed by: PHYSICIAN ASSISTANT

## 2025-06-13 PROCEDURE — 99239 HOSP IP/OBS DSCHRG MGMT >30: CPT | Performed by: PHYSICIAN ASSISTANT

## 2025-06-13 PROCEDURE — 36415 COLL VENOUS BLD VENIPUNCTURE: CPT | Performed by: PHYSICIAN ASSISTANT

## 2025-06-13 PROCEDURE — G0378 HOSPITAL OBSERVATION PER HR: HCPCS

## 2025-06-13 PROCEDURE — 99207 PR NO BILLABLE SERVICE THIS VISIT: CPT

## 2025-06-13 PROCEDURE — 85014 HEMATOCRIT: CPT | Performed by: PHYSICIAN ASSISTANT

## 2025-06-13 PROCEDURE — 80048 BASIC METABOLIC PNL TOTAL CA: CPT | Performed by: PHYSICIAN ASSISTANT

## 2025-06-13 RX ORDER — CLOPIDOGREL BISULFATE 75 MG/1
75 TABLET ORAL DAILY
Qty: 20 TABLET | Refills: 0 | Status: SHIPPED | OUTPATIENT
Start: 2025-06-14 | End: 2025-07-04

## 2025-06-13 RX ORDER — ATORVASTATIN CALCIUM 40 MG/1
40 TABLET, FILM COATED ORAL EVERY EVENING
Qty: 30 TABLET | Refills: 0 | Status: SHIPPED | OUTPATIENT
Start: 2025-06-13

## 2025-06-13 RX ORDER — ATORVASTATIN CALCIUM 40 MG/1
40 TABLET, FILM COATED ORAL EVERY EVENING
Status: DISCONTINUED | OUTPATIENT
Start: 2025-06-13 | End: 2025-06-13 | Stop reason: HOSPADM

## 2025-06-13 RX ADMIN — DULOXETINE 60 MG: 60 CAPSULE, DELAYED RELEASE ORAL at 08:10

## 2025-06-13 RX ADMIN — ASPIRIN 81 MG: 81 TABLET, DELAYED RELEASE ORAL at 08:10

## 2025-06-13 RX ADMIN — CLOPIDOGREL 75 MG: 75 TABLET ORAL at 08:10

## 2025-06-13 RX ADMIN — FAMOTIDINE 20 MG: 20 TABLET, FILM COATED ORAL at 08:10

## 2025-06-13 RX ADMIN — TOLTERODINE TARTRATE 4 MG: 4 CAPSULE, EXTENDED RELEASE ORAL at 08:10

## 2025-06-13 ASSESSMENT — ACTIVITIES OF DAILY LIVING (ADL)
ADLS_ACUITY_SCORE: 40

## 2025-06-13 NOTE — PROGRESS NOTES
"[facility]    Stroke Telephone Note    See stroke consult note from yesterday for HPI and secondary TIA and stroke prevention plan. LDL resulted and is 78.     Vitals  BP: 121/64   Pulse: 60   Resp: 16   Temp: 97.6  F (36.4  C)   Weight: 103.4 kg (228 lb)    Recommendations  - LDL 78 (goal 40-70); discontinue PTA Simvastatin 40 mg. Initiate Lipitor 40 mg daily (ordered)  - No further TIA/stroke workup is recommended, we will sign off. Please contact us for additional questions or concerns.    Case discussed with vascular neurology attending Dr. Foster.    My recommendations are based on the information provided over the phone by Smita Vazquez's in-person providers. They are not intended to replace the clinical judgment of her in-person providers. I was not requested to personally see or examine the patient at this time.     Carmel Blandon PA-C  Vascular Neurology    To page me or covering stroke neurology team member, click here: [facility]  Choose \"On Call\" tab at top, then select \"NEUROLOGY/ALL SITES\" from middle drop-down box, press Enter, then look for \"stroke\" or \"telestroke\" for your site.   "

## 2025-06-13 NOTE — PLAN OF CARE
"PRIMARY DIAGNOSIS: R side weakness  OUTPATIENT/OBSERVATION GOALS TO BE MET BEFORE DISCHARGE:  1. Pain Status: Pain free.     2. Return to near baseline physical activity: Yes     3. Cleared for discharge by consultants (if involved): No     Discharge Planner Nurse  Safe discharge environment identified: Yes  Barriers to discharge: Yes  /64 (BP Location: Right arm)   Pulse 59   Temp 97.8  F (36.6  C) (Oral)   Resp 16   Ht 1.626 m (5' 4\")   Wt 103.4 kg (228 lb)   SpO2 93%   BMI 39.14 kg/m   .  Patient is Alert and Oriented x4. They are independent with no assistive devices .  Pt is a Regular diet.  They are denying pain.  Patient is Saline locked. Neuro checks q4hrs. Denies SOB/Chest pain. Tele overnight  Please review provider order for any additional goals.   Nurse to notify provider when observation goals have been met and patient is ready for discharge.Goal Outcome Evaluation:       Plan of Care Reviewed With: patient     Overall Patient Progress: improvingOverall Patient Progress: improving     Outcome Evaluation: A&Ox4, Ind, VVS, Denies chest pain/SOB. Tele. Neuro checks q4hrs.      "

## 2025-06-13 NOTE — PLAN OF CARE
PRIMARY DIAGNOSIS: R side weakness  OUTPATIENT/OBSERVATION GOALS TO BE MET BEFORE DISCHARGE:  1. Pain Status: Pain free.    2. Return to near baseline physical activity: Yes    3. Cleared for discharge by consultants (if involved): No    Discharge Planner Nurse   Safe discharge environment identified: Yes  Barriers to discharge: Yes  Vitals are Temp: 97.8  F (36.6  C) Temp src: Oral BP: 115/80 Pulse: 68   Resp: 16 SpO2: 99 %.  Patient is Alert and Oriented x4. They are independent with no assistive devices .  Pt is a Regular diet.  They are denying pain.  Patient is Saline locked. Neuro checks q4hrs. Denies SOB/Chest pain. Tele overnight  Please review provider order for any additional goals.   Nurse to notify provider when observation goals have been met and patient is ready for discharge.Goal Outcome Evaluation:      Plan of Care Reviewed With: patient    Overall Patient Progress: improvingOverall Patient Progress: improving    Outcome Evaluation: A&Ox4, Ind, VVS, Denies chest pain/SOB. Tele. Neuro checks q4hrs.

## 2025-06-13 NOTE — PLAN OF CARE
PRIMARY DIAGNOSIS: TIA/Stroke eval  OUTPATIENT/OBSERVATION GOALS TO BE MET BEFORE DISCHARGE:  ADLs back to baseline: Yes    Activity and level of assistance: Ambulating independently.    Pain status: Pain free.    Return to near baseline physical activity: Yes     Discharge Planner Nurse   Safe discharge environment identified: Yes  Barriers to discharge: Yes       Entered by: Jessica Ribeiro RN 06/13/2025 9:44 AM    Vitals are Temp: 98.4  F (36.9  C) Temp src: Oral BP: 116/71 Pulse: 62   Resp: 16 SpO2: 96 %.  Patient is Alert and Oriented x4. They are independent with no assistive devices .  Pt is a Regular diet.  They are denying pain. Patient is Saline locked. To see neuro outpt.        Please review provider order for any additional goals.   Nurse to notify provider when observation goals have been met and patient is ready for discharge.    Goal Outcome Evaluation:      Plan of Care Reviewed With: patient    Overall Patient Progress: improvingOverall Patient Progress: improving    Outcome Evaluation: A&O x4. IND in room. Neuros intact. RB/SR on tele.

## 2025-06-13 NOTE — DISCHARGE SUMMARY
"[facility]  Hospitalist Discharge Summary      Date of Admission:  6/12/2025  Date of Discharge:  6/13/2025  Discharging Provider: Reba Vail  Discharge Service: Hospitalist Service    Discharge Diagnoses   Transient Ischemic Attack    Clinically Significant Risk Factors     # Obesity: Estimated body mass index is 39.14 kg/m  as calculated from the following:    Height as of this encounter: 1.626 m (5' 4\").    Weight as of this encounter: 103.4 kg (228 lb).       Follow-ups Needed After Discharge   Follow-up Appointments       Follow Up      Follow up in 6-8 weeks with Stroke [facility] ([phone])        Hospital Follow-up with Existing Primary Care Provider (PCP)          Schedule Primary Care visit within: 7 Days               Unresulted Labs Ordered in the Past 30 Days of this Admission       No orders found for last 31 day(s).        These results will be followed up by   Trina Ovalles NP    General - Nurse Practitioner    [phone]       Discharge Disposition   Discharged to home  Condition at discharge: Stable    Hospital Course   Smita Vazquez is a 69 year old female who has a past medical history of CKD, HLD, HTN, essential tremor, hyperthyroidism,nephrectomy (Left) donation and pulmonary embolism  and incidental stroke on MRI from 4/18/2019., who presented to the ED by private vehicle with complaint of right sided facial numbness, Right upper extremity weakness and numbness.     On arrival to the ED stroke code was activated.  Patient was afebrile, heart rate 76, pressure 130/90 and breathing comfortably on room air without hypoxia.  CT head negative, CT head perfusion negative, and CTA head/neck negative.  Lab work shows creatinine 1.29 which close to baseline, normal electrolytes, glucose elevated at 108, high-sensitivity troponin of 9 with 2-hour repeat of 8 and normal CBC.  EKG shows sinus rhythm.  Neurology was called and code stroke was downgraded. Patient was started " on Plavix and ASA and MRI ordered.  Symptoms had almost completely resolved so she was admitted as observation for further stroke work-up.     During her stay in observation follow on imaging was obtained:  -MRI: Negative for acute intracranial process, acute or chronic hemorrhage. No intracranial mass or mass effect.   -ECHO: Normal LV systolic function with EF of  60-65% and no regional wall motion abnormality.   - Cardiac telemetry showing no atrial fibrillation    She was seen by neurology, which notes suspicion of possible TIA and an ABCD2 score of 6. Further recommendation include monitoring LDL and blood pressure, and follow-up up with sleep medicine for untreated [place] and a zio monitor which was ordered.     She received Plavix which she will continue for 21 days, simvastatin was stopped replaced with Lipitor to improve LDL levels.  Continued on previous aspirin 81 mg indefinitely.  She had no acute events, no further or new neurological deficits and her symptoms resolved to her normal baseline.     She was discharged with plans for follow-up with her PCP, Stroke Neuro [facility] and her general neuro provider.       Consultations This Hospital Stay   NEUROLOGY IP STROKE CONSULT  PHARMACY IP CONSULT  PHARMACY IP CONSULT    Code Status   Full Code    Time Spent on this Encounter   IKen, personally saw the patient today and spent greater than 30 minutes discharging this patient.       Ken Childs  [facility] OBSERVATION DEPT  [facility] [address] [place] [address]  Phone: [phone]        Physician Attestation   Reba DUKE PA-C, was present with the medical/CHAU student who participated in the service and in the documentation of the note.  I have verified the history and personally performed the physical exam and medical decision making.  I agree with the assessment and plan of care as documented in the note.      Reba Vail PA-C  Date of Service (when  "I saw the patient): 06/13/25    ______________________________________________________________________    Physical Exam   Vital Signs: Temp: 98.4  F (36.9  C) Temp src: Oral BP: 116/71 Pulse: 62   Resp: 16 SpO2: 96 % O2 Device: None (Room air)    Weight: 228 lbs 0 oz  Constitutional: awake, alert, cooperative, no apparent distress, and appears stated age  Eyes: Lids and lashes normal, pupils equal, round and reactive to light, extra ocular muscles intact, sclera clear, conjunctiva normal  Respiratory: No increased work of breathing, good air exchange, clear to auscultation bilaterally, no crackles or wheezing  Cardiovascular: Normal apical impulse, regular rate and rhythm, normal S1 and S2, no S3 or S4, and no murmur noted  Neurologic: Awake, alert, oriented to name, place and time.  Cranial nerves II-XII are grossly intact.  Motor is 5 out of 5 bilaterally.  Cerebellar finger to nose, heel to shin intact.  Sensory is intact.  Babinski down going, Romberg negative, and gait is normal.  Motor Exam:  moves all extremities well and symmetrically  Motor exam is symmetrical 5 out of 5 upper extremities bilaterally  Sensory:  Sensory intact       Primary Care Physician   Trina Ovalles    Discharge Orders      Reason for your hospital stay    Neurologic symptoms, underwent work up for stroke. No new stroke seen on imaging but Stroke Neurology does recommend treating for possible \"TIA\" or mini stroke.     Activity    Your activity upon discharge: activity as tolerated     Follow Up    Follow up in 6-8 weeks with Stroke [facility] ([phone])     When to contact your care team    Call your primary care doctor if you have any of the following: temperature greater than 101 F, worsening shortness of breath, increased swelling, worsening pain, new or unrelenting diarrhea, or any other concerning symptoms. Call 911 or go to the emergency room if you need immediate assistance.     Diet    Follow this diet upon discharge: Current " Diet:Orders Placed This Encounter      Regular Diet Adult     Stroke Hospital Follow Up (for neurologist use only)    Please be aware that coverage of these services is subject to the terms and limitations of your health insurance plan.  Call member services at your health plan with any benefit or coverage questions.  [employer] will call you to coordinate care as prescribed by your provider. If you don t hear from a representative within 2 business days, please call [phone].       Hospital Follow-up with Existing Primary Care Provider (PCP)          ZIO PATCH MAIL OUT       Significant Results and Procedures   Results for orders placed or performed during the hospital encounter of 06/12/25   CT Head w/o Contrast    Narrative    EXAM: CT HEAD W/O CONTRAST  LOCATION: [facility]  DATE: 6/12/2025    INDICATION: Code Stroke, rule out hemorrhage and evaluate for potential thrombolysis thrombectomy. PLEASE READ IMMEDIATELY. Right arm weakness  COMPARISON: None.  TECHNIQUE: Routine CT Head without IV contrast. Multiplanar reformats. Dose reduction techniques were used.    FINDINGS:  INTRACRANIAL CONTENTS: No intracranial hemorrhage, extraaxial collection, or mass effect.  No CT evidence of acute infarct. Mild presumed chronic small vessel ischemic changes. Normal ventricles and sulci.     VISUALIZED ORBITS/SINUSES/MASTOIDS: No intraorbital abnormality. No paranasal sinus mucosal disease. No middle ear or mastoid effusion.    BONES/SOFT TISSUES: No acute abnormality.      Impression    IMPRESSION:  No acute intracranial injury, hemorrhage, mass, or CT evidence of recent ischemia.     CTA Head Neck with Contrast    Narrative    HEAD AND NECK CT ANGIOGRAM WITH IV CONTRAST  [facility]  6/12/2025 8:34 AM CDT    INDICATION: Code Stroke, evaluate for LVO. PLEASE READ IMMEDIATELY. Right arm weakness  TECHNIQUE: Head and neck CT angiogram with IV contrast. CT images of the  head and neck vessels obtained during the arterial phase of intravenous contrast administration. Axial helical 2D reconstructed images and multiplanar 3D MIP reconstructed images of   the head and neck vessels were performed by the technologist. Dose reduction techniques were used. Vessel stenoses reported according to NASCET criteria.  CONTRAST: 67mL Isovue 370  COMPARISON: None.     FINDINGS:  HEAD CTA: Mild intracranial atherosclerosis predominantly affecting the carotid siphons. No large vessel occlusion, aneurysm or arteriovenous malformation. The distal dural venous sinuses are not well visualized due to the arterial timing of the contrast   bolus.    NECK CTA: Three vessel arch.  Carotid arteries are patent without atherosclerotic change.  No hemodynamically significant stenosis by NASCET criteria in either carotid system.  There is a 3 mm linear outpouching along the superior margin of the distal   cervical left vertebral artery at C1 level (image 285 series 6) suspicious for a small pseudoaneurysm from prior vessel injury. No evidence for acute dissection or stenosis within either vertebral artery.      Impression    CONCLUSION:  HEAD CTA:  1.  Mild intracranial atherosclerosis.  2.  No vessel stenosis, occlusion or aneurysm.    NECK CTA:  1.  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  2.  No evidence for acute dissection or hemodynamically significant narrowing in the neck by NASCET criteria.    Findings were discussed with Dr. AB COVARRUBIAS via telephone at 853 hours CDT on 6/12/2025.     CT Head Perfusion w Contrast - For Tier 2 Stroke    Narrative    EXAM: CT HEAD PERFUSION W CONTRAST  LOCATION: [facility]  DATE/TIME: 6/12/2025 8:45 AM CDT    INDICATION: Code Stroke to evaluate for potential thrombolysis and thrombectomy. Evaluate mismatch between  penumbra and core infarct. READ IMMEDIATELY.  TECHNIQUE: CT cerebral perfusion was performed utilizing a contrast bolus. Perfusion data were post processed with generation of standard perfusion maps and estimation of ischemic/infarcted volumes utilizing standard threshold values. Dose reduction   techniques were used.   CONTRAST: 50mL Isovue 370  COMPARISON: None.    FINDINGS:   PERFUSION MAPS: Symmetrical cerebral perfusion. No focal deficits in cerebral blood flow or volume to suggest ischemia/oligemia.    RAPID ANALYSIS:  CBF<30%: 0 mL  Tmax>6sec: 0 mL  Mismatch volume: 0 mL  Mismatch ratio: None      Impression    IMPRESSION:   1.  Normal cerebral perfusion.   MR Brain w/o & w Contrast    Narrative    EXAM: MR BRAIN W/O and W CONTRAST  LOCATION: [facility]  DATE: 6/12/2025    INDICATION: Right arm weakness, right face paresthesias.  COMPARISON: Head CT and CT angiogram 6/12/2025 at 0828 hours.  CONTRAST: 10.5 mL Gadavist.  TECHNIQUE: Routine multiplanar multisequence head MRI without and with intravenous contrast.    FINDINGS:  INTRACRANIAL CONTENTS: No acute or subacute infarction. No restricted diffusion abnormality is evident. Corpus callosum is normal. No mass, acute hemorrhage, or extra-axial fluid collections. Normal brain mass effect, midline shift, chronic or   parenchymal signal. Mild generalized cerebral atrophy. No hydrocephalus. Prominent perivascular space or chronic lacunar type infarction inferior lateral right lentiform nucleus. Additional tiny chronic lacunar type infarction left cerebellar hemisphere   and bilateral thalami and bilateral basal ganglia noted. Normal position of the cerebellar tonsils. Postcontrast imaging shows no pathologic enhancement. No infectious/inflammatory or neoplastic enhancement is noted. Dural venous sinus Meckel's cave and   cavernous sinus patterns of enhancement are satisfactory overall.    SELLA: No abnormality accounting for technique. No  sella or suprasellar mass, hemorrhage or pathologic enhancement.    OSSEOUS STRUCTURES/SOFT TISSUES: Normal marrow signal. The major intracranial vascular flow voids are maintained.     ORBITS: No abnormality accounting for technique. No pathologic orbital enhancement.    SINUSES/MASTOIDS: No paranasal sinus mucosal disease. No paranasal sinus air-fluid levels. Nasopharynx is patent. Scattered fluid/membrane thickening in the left mastoid air cells. No apparent mass in the posterior nasopharynx or skull base.       Impression    IMPRESSION:  1.  No acute intracranial process.  2.  Generalized brain atrophy and presumed microvascular ischemic changes as detailed above.  3.  Chronic lacunar type infarctions and/or perivascular spaces in the basal ganglia and thalami and left cerebral hemisphere.  4.  No acute or chronic hemorrhage. No intracranial mass or mass effect. No restricted diffusion abnormality/recent infarction is evident.  5.  No pathologic enhancement intracranially.   Echocardiogram Complete - For age > 60 yrs     Value    LVEF  60-65%    Narrative    [id number]  [id number]  [id number]  [id number]^GERDA^ERICA^LAUREL     [facility]  Echocardiography Laboratory  [address] [place] [address]     Name: JASON MENDIETA  MRN: [id number]  : 1955  Study Date: 2025 01:13 PM  Age: 69 yrs  Gender: Female  Patient Location: [facility]  Reason For Study: CVA  Ordering Physician: ERICA LORENZ  Referring Physician: Trina Ovalles  Performed By: Nelida Tobin     BSA: 2.1 m2  Height: 64 in  Weight: 229 lb  HR: 62  BP: 135/59 mmHg  ______________________________________________________________________________  Procedure  Echocardiogram with two-dimensional, color and spectral Doppler. Optison (NDC  #[id number]) given intravenously.  ______________________________________________________________________________  Interpretation Summary     Left ventricular systolic function is  normal.  The visual ejection fraction is 60-65%.  No regional wall motion abnormalities noted.  The study was technically difficult. There is no comparison study available.  ______________________________________________________________________________  Left Ventricle  The left ventricle is normal in size. There is normal left ventricular wall  thickness. Left ventricular systolic function is normal. The visual ejection  fraction is 60-65%. Left ventricular diastolic function is normal. No regional  wall motion abnormalities noted.     Right Ventricle  The right ventricle is grossly normal size. The right ventricular systolic  function is normal.     Atria  Normal left atrial size. Right atrial size is normal.     Mitral Valve  There is trace mitral regurgitation.     Tricuspid Valve  There is trace tricuspid regurgitation. Right ventricular systolic pressure  could not be approximated due to inadequate tricuspid regurgitation.     Aortic Valve  The aortic valve is trileaflet. Mild aortic valve sclerosis. No aortic  stenosis is present.     Pulmonic Valve  The pulmonic valve is not well visualized.     Vessels  The aortic root is normal size.     Pericardium  There is no pericardial effusion.     Rhythm  Sinus rhythm was noted.  ______________________________________________________________________________  MMode/2D Measurements & Calculations  IVSd: 1.2 cm     LVIDd: 4.0 cm  LVIDs: 2.0 cm  LVPWd: 1.1 cm  FS: 50.9 %  LV mass(C)d: 151.9 grams  LV mass(C)dI: 73.3 grams/m2  Ao root diam: 2.8 cm  LVOT diam: 2.0 cm  LVOT area: 3.1 cm2  Ao root diam index Ht(cm/m): 1.7  Ao root diam index BSA (cm/m2): 1.4  LA Volume (BP): 33.3 ml  LA Volume Index (BP): 16.1 ml/m2  RWT: 0.53     TAPSE: 1.9 cm     Doppler Measurements & Calculations  MV E max jessica: 71.8 cm/sec  MV A max jessica: 60.3 cm/sec  MV E/A: 1.2  MV max P.8 mmHg  MV mean P.86 mmHg  MV V2 VTI: 21.5 cm  MV dec slope: 351.6 cm/sec2  MV dec time: 0.20 sec  PA V2 max:  64.2 cm/sec  PA max P.6 mmHg  PA acc time: 0.12 sec  E/E' av.3  Lateral E/e': 7.0  Medial E/e': 9.6  RV S Kevin: 9.7 cm/sec     ______________________________________________________________________________  Report approved by: Tan Bhandari MD on 2025 02:33 PM             Discharge Medications   Current Discharge Medication List        START taking these medications    Details   atorvastatin (LIPITOR) 40 MG tablet Take 1 tablet (40 mg) by mouth every evening.  Qty: 30 tablet, Refills: 0    Associated Diagnoses: TIA (transient ischemic attack)      clopidogrel (PLAVIX) 75 MG tablet Take 1 tablet (75 mg) by mouth daily for 20 days.  Qty: 20 tablet, Refills: 0    Associated Diagnoses: TIA (transient ischemic attack)           CONTINUE these medications which have NOT CHANGED    Details   acetaminophen (TYLENOL) 325 MG tablet Take 2 tablets (650 mg) by mouth every 4 hours as needed  Qty: 40 tablet, Refills: 0      aspirin 81 MG EC tablet Take 81 mg by mouth every evening.      DULoxetine (CYMBALTA) 60 MG capsule Take 60 mg by mouth daily       famotidine (PEPCID) 20 MG tablet Take 20 mg by mouth daily.      levothyroxine (SYNTHROID/LEVOTHROID) 50 MCG tablet Take 50 mcg by mouth every evening.      PROPRANOLOL HCL ER PO Take 220 mg by mouth daily. 160 mg + 60 mg      tolterodine ER (DETROL LA) 4 MG 24 hr capsule Take 4 mg by mouth daily.      vitamin D3 (CHOLECALCIFEROL) 50 mcg (2000 units) tablet Take 1 tablet by mouth daily           STOP taking these medications       simvastatin (ZOCOR) 40 MG tablet Comments:   Reason for Stopping:             Allergies   Allergies   Allergen Reactions    Carbamazepine     Dilantin [Phenytoin]     Sulfa Antibiotics

## 2025-06-13 NOTE — PLAN OF CARE
"PRIMARY DIAGNOSIS: R side weakness  OUTPATIENT/OBSERVATION GOALS TO BE MET BEFORE DISCHARGE:  1. Pain Status: Pain free.     2. Return to near baseline physical activity: Yes     3. Cleared for discharge by consultants (if involved): No     Discharge Planner Nurse  Safe discharge environment identified: Yes  Barriers to discharge: Yes  /64 (BP Location: Right arm)   Pulse 60   Temp 97.6  F (36.4  C) (Oral)   Resp 16   Ht 1.626 m (5' 4\")   Wt 103.4 kg (228 lb)   SpO2 96%   BMI 39.14 kg/m   .  Patient is Alert and Oriented x4. They are independent with no assistive devices .  Pt is a Regular diet.  They are denying pain.  Patient is Saline locked. Neuro checks q4hrs. Denies SOB/Chest pain. Tele overnight: SB 50's  Please review provider order for any additional goals.   Nurse to notify provider when observation goals have been met and patient is ready for discharge.Goal Outcome Evaluation:       Plan of Care Reviewed With: patient     Overall Patient Progress: improvingOverall Patient Progress: improving     Outcome Evaluation: A&Ox4, Ind, VVS, Denies chest pain/SOB. Tele. Neuro checks q4hrs.          "

## 2025-06-14 ENCOUNTER — PATIENT OUTREACH (OUTPATIENT)
Dept: CARE COORDINATION | Facility: CLINIC | Age: 70
End: 2025-06-14
Payer: COMMERCIAL

## 2025-06-14 NOTE — PROGRESS NOTES
"[facility]  Transitions of Care Outreach  Chief Complaint   Patient presents with    Clinic Care Coordination - Post Hospital       Most Recent Admission Date: 6/12/2025   Most Recent Admission Diagnosis: Slurred speech - R47.81  Right arm weakness - R29.898  Arm paresthesia, right - R20.2  Acute CVA (cerebrovascular accident) (H) - I63.9  Facial paresthesia - R20.2     Most Recent Discharge Date: 6/13/2025   Most Recent Discharge Diagnosis: Right arm weakness - R29.898  Arm paresthesia, right - R20.2  Facial paresthesia - R20.2  Slurred speech - R47.81  Acute CVA (cerebrovascular accident) (H) - I63.9  TIA (transient ischemic attack) - G45.9     Transitions of Care Assessment    Discharge Assessment  How are you doing now that you are home?: \"I'm fine\"  How are your symptoms? (Red Flag symptoms escalate to triage hotline per guidelines): Improved  Do you know how to contact your clinic care team if you have future questions or changes to your health status? : Yes  Does the patient have their discharge instructions? : Yes  Does the patient have questions regarding their discharge instructions? : No  Were you started on any new medications or were there changes to any of your previous medications? : Yes  Does the patient have all of their medications?: Yes  Do you have questions regarding any of your medications? : No  Do you have all of your needed medical supplies or equipment (DME)?  (i.e. oxygen tank, CPAP, cane, etc.): Yes    Follow up Plan     Discharge Follow-Up  Discharge follow up appointment scheduled in alignment with recommended follow up timeframe or Transitions of Risk Category? (Low = within 30 days; Moderate= within 14 days; High= within 7 days): Yes  Discharge Follow Up Appointment Date: 06/19/25  Discharge Follow Up Appointment Scheduled with?: Primary Care Provider    No future appointments.    Outpatient Plan as outlined on AVS reviewed with patient.    For any urgent concerns, " please contact our 24 hour nurse triage line: [phone] ([phone])         Giana Bennett  Community Health Worker  [facility] Care Resource [place], [facility]    *Connected Care Resource Team does NOT follow patient ongoing. Referrals are identified based on internal discharge reports and the outreach is to ensure patient has an understanding of their discharge instructions.

## 2025-06-16 ENCOUNTER — PATIENT OUTREACH (OUTPATIENT)
Dept: CARE COORDINATION | Facility: CLINIC | Age: 70
End: 2025-06-16
Payer: COMMERCIAL

## 2025-07-05 ENCOUNTER — HEALTH MAINTENANCE LETTER (OUTPATIENT)
Age: 70
End: 2025-07-05

## 2025-07-08 LAB — CV ZIO PRELIM RESULTS: NORMAL

## 2025-07-17 ENCOUNTER — PATIENT OUTREACH (OUTPATIENT)
Dept: CARE COORDINATION | Facility: CLINIC | Age: 70
End: 2025-07-17
Payer: COMMERCIAL

## 2025-08-04 PROBLEM — E78.5 HYPERLIPIDEMIA LDL GOAL <70: Status: ACTIVE | Noted: 2025-08-04

## 2025-08-04 PROBLEM — G47.33 OSA (OBSTRUCTIVE SLEEP APNEA): Status: ACTIVE | Noted: 2025-08-04

## 2025-08-04 PROBLEM — G45.9 TIA (TRANSIENT ISCHEMIC ATTACK): Status: ACTIVE | Noted: 2025-08-04

## 2025-08-04 PROBLEM — I63.9 ACUTE CVA (CEREBROVASCULAR ACCIDENT) (H): Status: RESOLVED | Noted: 2025-06-12 | Resolved: 2025-08-04

## 2025-08-06 ENCOUNTER — OFFICE VISIT (OUTPATIENT)
Dept: NEUROLOGY | Facility: CLINIC | Age: 70
End: 2025-08-06
Payer: COMMERCIAL

## 2025-08-06 VITALS
BODY MASS INDEX: 39.65 KG/M2 | SYSTOLIC BLOOD PRESSURE: 121 MMHG | HEART RATE: 61 BPM | WEIGHT: 231 LBS | DIASTOLIC BLOOD PRESSURE: 71 MMHG

## 2025-08-06 DIAGNOSIS — E78.5 HYPERLIPIDEMIA LDL GOAL <70: ICD-10-CM

## 2025-08-06 DIAGNOSIS — G45.9 TIA (TRANSIENT ISCHEMIC ATTACK): Primary | ICD-10-CM

## 2025-08-06 DIAGNOSIS — G47.33 OSA (OBSTRUCTIVE SLEEP APNEA): ICD-10-CM

## 2025-08-06 PROCEDURE — 99215 OFFICE O/P EST HI 40 MIN: CPT | Performed by: NURSE PRACTITIONER

## 2025-08-06 PROCEDURE — 3074F SYST BP LT 130 MM HG: CPT | Performed by: NURSE PRACTITIONER

## 2025-08-06 PROCEDURE — 3078F DIAST BP <80 MM HG: CPT | Performed by: NURSE PRACTITIONER

## 2025-08-06 RX ORDER — ALBUTEROL SULFATE 90 UG/1
1-2 INHALANT RESPIRATORY (INHALATION)
COMMUNITY
Start: 2025-02-27